# Patient Record
Sex: MALE | Race: WHITE | URBAN - METROPOLITAN AREA
[De-identification: names, ages, dates, MRNs, and addresses within clinical notes are randomized per-mention and may not be internally consistent; named-entity substitution may affect disease eponyms.]

---

## 2017-03-28 ENCOUNTER — HOSPITAL ENCOUNTER (EMERGENCY)
Facility: CLINIC | Age: 77
Discharge: ANOTHER HOSPITAL | End: 2017-03-28
Attending: EMERGENCY MEDICINE | Admitting: EMERGENCY MEDICINE
Payer: MEDICARE

## 2017-03-28 ENCOUNTER — HOSPITAL ENCOUNTER (INPATIENT)
Dept: CARDIOLOGY | Facility: CLINIC | Age: 77
Discharge: NURSING, REHAB OR LONG-TERM CARE | End: 2017-04-14
Attending: INTERNAL MEDICINE | Admitting: INTERNAL MEDICINE
Payer: COMMERCIAL

## 2017-03-28 ENCOUNTER — SURGERY - HEALTHEAST (OUTPATIENT)
Dept: CARDIOLOGY | Facility: CLINIC | Age: 77
End: 2017-03-28

## 2017-03-28 ENCOUNTER — APPOINTMENT (OUTPATIENT)
Dept: GENERAL RADIOLOGY | Facility: CLINIC | Age: 77
End: 2017-03-28
Attending: EMERGENCY MEDICINE
Payer: MEDICARE

## 2017-03-28 VITALS
TEMPERATURE: 97.7 F | OXYGEN SATURATION: 96 % | RESPIRATION RATE: 15 BRPM | BODY MASS INDEX: 34.86 KG/M2 | SYSTOLIC BLOOD PRESSURE: 129 MMHG | DIASTOLIC BLOOD PRESSURE: 72 MMHG | WEIGHT: 230 LBS | HEIGHT: 68 IN

## 2017-03-28 DIAGNOSIS — E78.5 DYSLIPIDEMIA: ICD-10-CM

## 2017-03-28 DIAGNOSIS — H40.9 GLAUCOMA: ICD-10-CM

## 2017-03-28 DIAGNOSIS — M10.9 GOUT: ICD-10-CM

## 2017-03-28 DIAGNOSIS — I25.83 CORONARY ARTERY DISEASE DUE TO LIPID RICH PLAQUE: ICD-10-CM

## 2017-03-28 DIAGNOSIS — I25.10 CORONARY ARTERY DISEASE DUE TO LIPID RICH PLAQUE: ICD-10-CM

## 2017-03-28 DIAGNOSIS — D69.6 THROMBOCYTOPENIA (H): ICD-10-CM

## 2017-03-28 DIAGNOSIS — G47.00 INSOMNIA: ICD-10-CM

## 2017-03-28 DIAGNOSIS — I48.0 PAROXYSMAL ATRIAL FIBRILLATION (H): ICD-10-CM

## 2017-03-28 DIAGNOSIS — N40.0 BPH (BENIGN PROSTATIC HYPERPLASIA): ICD-10-CM

## 2017-03-28 DIAGNOSIS — R33.9 URINARY RETENTION: ICD-10-CM

## 2017-03-28 DIAGNOSIS — R52 PAIN: ICD-10-CM

## 2017-03-28 DIAGNOSIS — N28.9 RENAL INSUFFICIENCY: ICD-10-CM

## 2017-03-28 DIAGNOSIS — Z13.9 SCREENING: ICD-10-CM

## 2017-03-28 DIAGNOSIS — I21.4 NSTEMI (NON-ST ELEVATED MYOCARDIAL INFARCTION) (H): ICD-10-CM

## 2017-03-28 DIAGNOSIS — Z95.1 S/P CABG X 3: ICD-10-CM

## 2017-03-28 DIAGNOSIS — K21.9 GERD (GASTROESOPHAGEAL REFLUX DISEASE): ICD-10-CM

## 2017-03-28 DIAGNOSIS — D64.9 ANEMIA: ICD-10-CM

## 2017-03-28 DIAGNOSIS — E11.9 TYPE 2 DIABETES MELLITUS WITHOUT COMPLICATION, WITHOUT LONG-TERM CURRENT USE OF INSULIN (H): ICD-10-CM

## 2017-03-28 DIAGNOSIS — E87.70 VOLUME OVERLOAD: ICD-10-CM

## 2017-03-28 DIAGNOSIS — D64.9 ANEMIA, UNSPECIFIED TYPE: ICD-10-CM

## 2017-03-28 LAB
ALBUMIN SERPL-MCNC: 3.1 G/DL (ref 3.4–5)
ALP SERPL-CCNC: 82 U/L (ref 40–150)
ALT SERPL W P-5'-P-CCNC: 22 U/L (ref 0–70)
ANION GAP SERPL CALCULATED.3IONS-SCNC: 14 MMOL/L (ref 3–14)
AORTIC ROOT: 3.7 CM
AORTIC VALVE MEAN VELOCITY: 78.5 CM/S
ASCENDING AORTA: 2.8 CM
AST SERPL W P-5'-P-CCNC: 15 U/L (ref 0–45)
AV DIMENSIONLESS INDEX VTI: 0.8
AV MEAN GRADIENT: 3 MMHG
AV PEAK GRADIENT: 6.6 MMHG
AV VALVE AREA: 2.1 CM2
AV VELOCITY RATIO: 0.8
BASOPHILS # BLD AUTO: 0 10E9/L (ref 0–0.2)
BASOPHILS NFR BLD AUTO: 0.5 %
BILIRUB SERPL-MCNC: 0.2 MG/DL (ref 0.2–1.3)
BSA FOR ECHO PROCEDURE: 2.3 M2
BUN SERPL-MCNC: 27 MG/DL (ref 7–30)
CALCIUM SERPL-MCNC: 8.3 MG/DL (ref 8.5–10.1)
CHLORIDE SERPL-SCNC: 108 MMOL/L (ref 94–109)
CHOLEST SERPL-MCNC: 160 MG/DL
CO2 SERPL-SCNC: 20 MMOL/L (ref 20–32)
CREAT SERPL-MCNC: 1.43 MG/DL (ref 0.66–1.25)
CV BLOOD PRESSURE: NORMAL MMHG
CV ECHO HEIGHT: 68 IN
CV ECHO WEIGHT: 244 LBS
DIFFERENTIAL METHOD BLD: ABNORMAL
DOP CALC AO PEAK VEL: 128 CM/S
DOP CALC AO VTI: 26.7 CM
DOP CALC LVOT AREA: 2.54 CM2
DOP CALC LVOT DIAMETER: 1.8 CM
DOP CALC LVOT PEAK VEL: 99.6 CM/S
DOP CALC LVOT STROKE VOLUME: 57 CM3
DOP CALCLVOT PEAK VEL VTI: 22.4 CM
EJECTION FRACTION: 56 % (ref 55–75)
EOSINOPHIL # BLD AUTO: 0.4 10E9/L (ref 0–0.7)
EOSINOPHIL NFR BLD AUTO: 7.2 %
ERYTHROCYTE [DISTWIDTH] IN BLOOD BY AUTOMATED COUNT: 13.8 % (ref 10–15)
FRACTIONAL SHORTENING: 26.2 % (ref 28–44)
GFR SERPL CREATININE-BSD FRML MDRD: 48 ML/MIN/1.7M2
GLUCOSE SERPL-MCNC: 162 MG/DL (ref 70–99)
HBA1C MFR BLD: 6 % (ref 4.2–6.1)
HCT VFR BLD AUTO: 40.6 % (ref 40–53)
HDLC SERPL-MCNC: 41 MG/DL
HGB BLD-MCNC: 12.7 G/DL (ref 13.3–17.7)
IMM GRANULOCYTES # BLD: 0 10E9/L (ref 0–0.4)
IMM GRANULOCYTES NFR BLD: 0.7 %
INTERVENTRICULAR SEPTUM IN END DIASTOLE: 1.6 CM (ref 0.6–1)
IVS/PW RATIO: 0.9
LA AREA 1: 20.4 CM2
LA AREA 2: 20.1 CM2
LDLC SERPL CALC-MCNC: ABNORMAL MG/DL
LEFT ATRIUM LENGTH: 5.04 CM
LEFT ATRIUM SIZE: 4.8 CM
LEFT ATRIUM VOLUME INDEX: 30.1 ML/M2
LEFT ATRIUM VOLUME: 69.2 CM3
LEFT VENTRICLE CARDIAC INDEX: 1.6 L/MIN/M2
LEFT VENTRICLE CARDIAC OUTPUT: 3.7 L/MIN
LEFT VENTRICLE DIASTOLIC VOLUME INDEX: 42.2 CM3/M2 (ref 34–74)
LEFT VENTRICLE DIASTOLIC VOLUME: 97 CM3 (ref 62–150)
LEFT VENTRICLE HEART RATE: 65 BPM
LEFT VENTRICLE MASS INDEX: 251.9 G/M2
LEFT VENTRICLE SYSTOLIC VOLUME INDEX: 18.7 CM3/M2 (ref 11–31)
LEFT VENTRICLE SYSTOLIC VOLUME: 43 CM3 (ref 21–61)
LEFT VENTRICULAR INTERNAL DIMENSION IN DIASTOLE: 6.5 CM (ref 4.2–5.8)
LEFT VENTRICULAR INTERNAL DIMENSION IN SYSTOLE: 4.8 CM (ref 2.5–4)
LEFT VENTRICULAR MASS: 579.4 G
LEFT VENTRICULAR OUTFLOW TRACT MEAN GRADIENT: 2 MMHG
LEFT VENTRICULAR OUTFLOW TRACT MEAN VELOCITY: 62.6 CM/S
LEFT VENTRICULAR OUTFLOW TRACT PEAK GRADIENT: 4 MMHG
LEFT VENTRICULAR POSTERIOR WALL IN END DIASTOLE: 1.8 CM (ref 0.6–1)
LV STROKE VOLUME INDEX: 24.8 ML/M2
LYMPHOCYTES # BLD AUTO: 1.6 10E9/L (ref 0.8–5.3)
LYMPHOCYTES NFR BLD AUTO: 26.9 %
MCH RBC QN AUTO: 28.7 PG (ref 26.5–33)
MCHC RBC AUTO-ENTMCNC: 31.3 G/DL (ref 31.5–36.5)
MCV RBC AUTO: 92 FL (ref 78–100)
MITRAL VALVE E/A RATIO: 0.8
MONOCYTES # BLD AUTO: 0.6 10E9/L (ref 0–1.3)
MONOCYTES NFR BLD AUTO: 10.4 %
MV AVERAGE E/E' RATIO: 18.8 CM/S
MV DECELERATION TIME: 264 MS
MV E'TISSUE VEL-LAT: 6.04 CM/S
MV E'TISSUE VEL-MED: 5.56 CM/S
MV LATERAL E/E' RATIO: 18
MV MEDIAL E/E' RATIO: 19.6
MV PEAK A VELOCITY: 135 CM/S
MV PEAK E VELOCITY: 109 CM/S
NEUTROPHILS # BLD AUTO: 3.3 10E9/L (ref 1.6–8.3)
NEUTROPHILS NFR BLD AUTO: 54.3 %
NT-PROBNP SERPL-MCNC: 151 PG/ML (ref 0–1800)
NUC REST DIASTOLIC VOLUME INDEX: 3911 LBS
NUC REST SYSTOLIC VOLUME INDEX: 68 IN
PLATELET # BLD AUTO: 72 10E9/L (ref 150–450)
POTASSIUM SERPL-SCNC: 4.3 MMOL/L (ref 3.4–5.3)
PROT SERPL-MCNC: 7.1 G/DL (ref 6.8–8.8)
RBC # BLD AUTO: 4.42 10E12/L (ref 4.4–5.9)
SODIUM SERPL-SCNC: 142 MMOL/L (ref 133–144)
TRIGL SERPL-MCNC: 419 MG/DL
TROPONIN I SERPL-MCNC: 0.04 UG/L (ref 0–0.04)
TROPONIN I SERPL-MCNC: 0.39 UG/L (ref 0–0.04)
WBC # BLD AUTO: 6 10E9/L (ref 4–11)

## 2017-03-28 PROCEDURE — 96361 HYDRATE IV INFUSION ADD-ON: CPT

## 2017-03-28 PROCEDURE — 71020 XR CHEST 2 VW: CPT

## 2017-03-28 PROCEDURE — 83880 ASSAY OF NATRIURETIC PEPTIDE: CPT | Performed by: EMERGENCY MEDICINE

## 2017-03-28 PROCEDURE — 85025 COMPLETE CBC W/AUTO DIFF WBC: CPT | Performed by: EMERGENCY MEDICINE

## 2017-03-28 PROCEDURE — 84484 ASSAY OF TROPONIN QUANT: CPT | Performed by: EMERGENCY MEDICINE

## 2017-03-28 PROCEDURE — 99285 EMERGENCY DEPT VISIT HI MDM: CPT | Mod: 25

## 2017-03-28 PROCEDURE — 93005 ELECTROCARDIOGRAM TRACING: CPT

## 2017-03-28 PROCEDURE — 93010 ELECTROCARDIOGRAM REPORT: CPT | Performed by: EMERGENCY MEDICINE

## 2017-03-28 PROCEDURE — 99285 EMERGENCY DEPT VISIT HI MDM: CPT | Mod: 25 | Performed by: EMERGENCY MEDICINE

## 2017-03-28 PROCEDURE — 25000128 H RX IP 250 OP 636: Performed by: EMERGENCY MEDICINE

## 2017-03-28 PROCEDURE — 80053 COMPREHEN METABOLIC PANEL: CPT | Performed by: EMERGENCY MEDICINE

## 2017-03-28 PROCEDURE — 96374 THER/PROPH/DIAG INJ IV PUSH: CPT

## 2017-03-28 PROCEDURE — 25000132 ZZH RX MED GY IP 250 OP 250 PS 637: Performed by: EMERGENCY MEDICINE

## 2017-03-28 RX ORDER — BRIMONIDINE TARTRATE AND TIMOLOL MALEATE 2; 5 MG/ML; MG/ML
1 SOLUTION OPHTHALMIC 2 TIMES DAILY
COMMUNITY
End: 2017-04-16

## 2017-03-28 RX ORDER — NITROGLYCERIN 0.4 MG/1
0.4 TABLET SUBLINGUAL EVERY 5 MIN PRN
Status: DISCONTINUED | OUTPATIENT
Start: 2017-03-28 | End: 2017-03-28 | Stop reason: HOSPADM

## 2017-03-28 RX ADMIN — HEPARIN SODIUM 12 UNITS/KG/HR: 10000 INJECTION, SOLUTION INTRAVENOUS at 06:02

## 2017-03-28 RX ADMIN — Medication 5000 UNITS: at 06:08

## 2017-03-28 RX ADMIN — NITROGLYCERIN 0.4 MG: 0.4 TABLET SUBLINGUAL at 03:25

## 2017-03-28 ASSESSMENT — ENCOUNTER SYMPTOMS
APPETITE CHANGE: 0
BACK PAIN: 0
SHORTNESS OF BREATH: 1
CHEST TIGHTNESS: 1
FATIGUE: 0
ABDOMINAL PAIN: 0
NAUSEA: 0
FEVER: 0
NUMBNESS: 0
NECK PAIN: 0
VOMITING: 0
CHILLS: 0
LIGHT-HEADEDNESS: 0
WHEEZING: 0
HEADACHES: 0
WEAKNESS: 0
DIARRHEA: 0

## 2017-03-28 ASSESSMENT — MIFFLIN-ST. JEOR
SCORE: 1793.26

## 2017-03-28 NOTE — ED NOTES
Visiting the area staying with family   Awoke with substernal chest pain that radiates down L arm no nausea no dizziness took asa history of MI with stent placement  states this feels similar to MI   911 called was given ntg  1 with no changes hairoute  Has some chronic SOB former smoker

## 2017-03-28 NOTE — ED PROVIDER NOTES
"  History     Chief Complaint   Patient presents with     Chest Pain     HPI  Almas Gaona is a 76 year old male with history of hypertension, hyperlipidemia, diabetes, and previous coronary artery disease with previous MI and stent in his circumflex roughly 20 years ago presents for evaluation of acute onset of central chest pain radiating to his left arm which woke him from sleep about 1 hour prior to ED arrival.  Patient reports associated difficulty breathing without nausea or diaphoresis.  Patient reports symptoms are similar to the previous symptoms he had when he had his last heart attack.  Patient reports he is on aspirin 325 mg daily and also received an additional 324 mg by EMS.  Patient was given sublingual nitro by EMS with no significant change in his chest pain but did have improvement in his breathing.  Overall, patient reports symptoms have improved since EMS contact.  Patient does report lower extremity edema but not significantly changed from baseline.  Denies recent cough or infectious symptoms.  Denies frequent paroxysmal nocturnal dyspnea.    I have reviewed the Medications, Allergies, Past Medical and Surgical History, and Social History in the Epic system.    Review of Systems   Constitutional: Negative for appetite change, chills, fatigue and fever.   HENT: Negative for congestion.    Respiratory: Positive for chest tightness and shortness of breath. Negative for wheezing.    Cardiovascular: Positive for chest pain and leg swelling (chronic, not significantly changed tonight).   Gastrointestinal: Negative for abdominal pain, diarrhea, nausea and vomiting.   Musculoskeletal: Negative for back pain and neck pain.   Skin: Negative for rash.   Neurological: Negative for weakness, light-headedness, numbness and headaches.   All other systems reviewed and are negative.      Physical Exam   BP: 140/82  Heart Rate: 96  Temp: 97.7  F (36.5  C)  Resp: 15  Height: 172.7 cm (5' 8\")  Weight: 104.3 kg " (230 lb)  SpO2: 97 %  Physical Exam   Constitutional: He is oriented to person, place, and time. He appears well-developed and well-nourished. No distress.   HENT:   Head: Normocephalic and atraumatic.   Mouth/Throat: Oropharynx is clear and moist.   Cardiovascular: Normal rate.  An irregular rhythm present.   No murmur heard.  Pulmonary/Chest: Effort normal. He has decreased breath sounds in the right lower field and the left lower field.   Abdominal: Soft. Bowel sounds are normal. There is no tenderness.   Morbidly obese   Genitourinary: Rectal exam shows guaiac negative stool (Hemoccult-negative:  passed).   Musculoskeletal: Normal range of motion. He exhibits edema (1-2+ pitting edema bilaterally).   Neurological: He is alert and oriented to person, place, and time.   Skin: Skin is warm and dry. He is not diaphoretic.   Psychiatric: He has a normal mood and affect.   Nursing note and vitals reviewed.      ED Course     ED Course     Procedures             EKG Interpretation:      Interpreted by Ta Johnson  Time reviewed: 0310  Symptoms at time of EKG: Chest pain   Rhythm: Sinus rhythm with frequent PVCs and a wandering baseline secondary to movement  Rate: Normal  Axis: Normal  Ectopy: premature ventricular contractions (frequent)  Conduction: normal  ST Segments/ T Waves: Probable anterior and inferior lateral ST depressions although motion artifact limits interpretation  Q Waves: II  Comparison to prior: No old EKG available    Clinical Impression: Sinus rhythm with frequent PVCs and old anterior lateral ST depressions         EKG 2 Interpretation:      Interpreted by Ta Johnson  Time reviewed:0358   Symptoms at time of EKG: dyspnea   Rhythm: normal sinus   Rate: normal  Axis: NORMAL  Ectopy: none  Conduction: normal  ST Segments/ T Waves: ST depressions present in leads 1, 2, aVL as well as leads V4 V5 and V6  Q Waves: Lead II  Comparison to prior: Resolution of previous  PVCs.  Anterior depressions resolved but inferior lateral depressions still subtly present     Clinical Impression: sinus rhythm with inferior lateral ST depression      Results for orders placed or performed during the hospital encounter of 03/28/17   XR Chest 2 Views    Narrative    CHEST 2 VIEWS  3/28/2017 3:53 AM     HISTORY: Dyspnea.    COMPARISON: None.    FINDINGS: Hyperinflated lungs. Minimal opacities in the left lung  base. The lungs are otherwise clear. Borderline cardiomegaly.      Impression    IMPRESSION:   1. Minimal opacities in the left lung base could represent atelectasis  or pneumonia.  2. No other findings suspicious for active cardiopulmonary disease.  3. Hyperinflated lungs, suggestive of chronic obstructive pulmonary  disease.    SATISH WYNN MD   CBC with platelets differential   Result Value Ref Range    WBC 6.0 4.0 - 11.0 10e9/L    RBC Count 4.42 4.4 - 5.9 10e12/L    Hemoglobin 12.7 (L) 13.3 - 17.7 g/dL    Hematocrit 40.6 40.0 - 53.0 %    MCV 92 78 - 100 fl    MCH 28.7 26.5 - 33.0 pg    MCHC 31.3 (L) 31.5 - 36.5 g/dL    RDW 13.8 10.0 - 15.0 %    Platelet Count 72 (L) 150 - 450 10e9/L    Diff Method Automated Method     % Neutrophils 54.3 %    % Lymphocytes 26.9 %    % Monocytes 10.4 %    % Eosinophils 7.2 %    % Basophils 0.5 %    % Immature Granulocytes 0.7 %    Absolute Neutrophil 3.3 1.6 - 8.3 10e9/L    Absolute Lymphocytes 1.6 0.8 - 5.3 10e9/L    Absolute Monocytes 0.6 0.0 - 1.3 10e9/L    Absolute Eosinophils 0.4 0.0 - 0.7 10e9/L    Absolute Basophils 0.0 0.0 - 0.2 10e9/L    Abs Immature Granulocytes 0.0 0 - 0.4 10e9/L   Comprehensive metabolic panel   Result Value Ref Range    Sodium 142 133 - 144 mmol/L    Potassium 4.3 3.4 - 5.3 mmol/L    Chloride 108 94 - 109 mmol/L    Carbon Dioxide 20 20 - 32 mmol/L    Anion Gap 14 3 - 14 mmol/L    Glucose 162 (H) 70 - 99 mg/dL    Urea Nitrogen 27 7 - 30 mg/dL    Creatinine 1.43 (H) 0.66 - 1.25 mg/dL    GFR Estimate 48 (L) >60 mL/min/1.7m2     GFR Estimate If Black 58 (L) >60 mL/min/1.7m2    Calcium 8.3 (L) 8.5 - 10.1 mg/dL    Bilirubin Total 0.2 0.2 - 1.3 mg/dL    Albumin 3.1 (L) 3.4 - 5.0 g/dL    Protein Total 7.1 6.8 - 8.8 g/dL    Alkaline Phosphatase 82 40 - 150 U/L    ALT 22 0 - 70 U/L    AST 15 0 - 45 U/L   Troponin I   Result Value Ref Range    Troponin I ES 0.040 0.000 - 0.045 ug/L   Nt probnp inpatient (BNP)   Result Value Ref Range    N-Terminal Pro BNP Inpatient 151 0 - 1800 pg/mL   Troponin I (second draw)   Result Value Ref Range    Troponin I ES 0.389 (HH) 0.000 - 0.045 ug/L         4:05 AM: Called lab due to trop and BNP not running. Will add on now.    4:32 AM: Trop measurable but not positive. Will repeat 1hr level. BNP normal.  Creatinine elevated at 1.43. Pt re-assessed. Pain nearly gone. 1/10 now. Advised of plan.     5:47 AM: Advised pt of climbing trop. Heparin ordered. Pt previously had a stent at [facility] and patient would like to return there.  Paged cardiologist at [facility] for transfer    6:10 AM: Repaged    6:30 AM: Repaged    6:48 AM: Discussed with Hospitalist Dr Rodriguez. Accepts for transfer.    Assessments & Plan (with Medical Decision Making)  76-year-old male with known history of coronary artery disease status post stent past as well as a history of chronic kidney disease presents for evaluation of acute onset of chest pain with difficulty breathing tonight.  Patient reports chest pain symptoms are similar to previous cardiac events.  Chest pain and breathing difficulty improved with nitroglycerin prehospital and in the emergency department.  Initial EKG showed some ST depression suggestive of possible ischemia however, no previous EKG was available for comparison.  Initial troponin measurable blood below the threshold to be a positive test at 0.04.  BNP normal at 151.  Hemoglobin slightly low, 0.7.  Creatinine moderately elevated at 1.43.  Patient is uncertain what his baseline creatinine is.  Troponins were repeated  one hour after arrival to evaluate for trending increase.  One hour level showed nearly tenfold increase in troponin consistent with ACS.  Platelets mildly low at 72 but patient has no symptoms or history of bleeding.  Given the rapidly climbing troponin, risk of bleeding considered lower than the risk for ACS.  Heparin started and patient transferred to [facility].      I have reviewed the nursing notes.    I have reviewed the findings, diagnosis, plan and need for follow up with the patient.    New Prescriptions    No medications on file       Final diagnoses:   NSTEMI (non-ST elevated myocardial infarction) (H)   Thrombocytopenia (H)   Renal insufficiency   Anemia, unspecified type       3/28/2017   [facility] EMERGENCY DEPARTMENT     Johnson, Ta Coburn MD  03/28/17 0641

## 2017-03-28 NOTE — ED NOTES
Bed: ED03  Expected date: 3/28/17  Expected time: 3:05 AM  Means of arrival:   Comments:  chest pain

## 2017-03-28 NOTE — PHARMACY-ANTICOAGULATION SERVICE
Patient to start the heparin C-V/Vascular protocol with a goal anti 10a level of 0.15-0.35. Using a HT of 68 inches and a WT of 104.3 kg, a dosing WT of 82.8 kg was calculated. Based on this dosing WT, give patient a heparin bolus of 5000 units from the bag and then start a drip at 1000 units/hr. Check the patient's anti 10a level 6 hours after starting the drip at ~0600.   Per C-V/Vascular protocol.    ZENY Fink.Ph.

## 2017-03-30 ENCOUNTER — ANESTHESIA - HEALTHEAST (OUTPATIENT)
Dept: SURGERY | Facility: CLINIC | Age: 77
End: 2017-03-30

## 2017-03-30 LAB
ATRIAL RATE - MUSE: 61 BPM
DIASTOLIC BLOOD PRESSURE - MUSE: NORMAL MMHG
INTERPRETATION ECG - MUSE: NORMAL
P AXIS - MUSE: 44 DEGREES
PR INTERVAL - MUSE: 196 MS
QRS DURATION - MUSE: 100 MS
QT - MUSE: 410 MS
QTC - MUSE: 412 MS
R AXIS - MUSE: -20 DEGREES
SYSTOLIC BLOOD PRESSURE - MUSE: NORMAL MMHG
T AXIS - MUSE: 7 DEGREES
VENTRICULAR RATE- MUSE: 61 BPM

## 2017-03-30 ASSESSMENT — MIFFLIN-ST. JEOR
SCORE: 1777.67

## 2017-03-31 LAB
HEPARIN INDUCED PLATELET ANTIBODY - HISTORICAL: 0.17 OD (ref 0–0.4)
HIV 1+2 AB+HIV1 P24 AG SERPL QL IA: NEGATIVE
INHIBITION %: NORMAL %

## 2017-03-31 ASSESSMENT — MIFFLIN-ST. JEOR
SCORE: 1738.21

## 2017-04-01 LAB
ERYTHROCYTE [DISTWIDTH] IN BLOOD BY AUTOMATED COUNT: 13.9 % (ref 11–14.5)
HCT VFR BLD AUTO: 39.3 % (ref 40–54)
HGB BLD-MCNC: 12.9 G/DL (ref 14–18)
MCH RBC QN AUTO: 29.7 PG (ref 27–34)
MCHC RBC AUTO-ENTMCNC: 32.8 G/DL (ref 32–36)
MCV RBC AUTO: 91 FL (ref 80–100)
PATH REPORT.MICROSCOPIC SPEC OTHER STN: ABNORMAL
PLATELET # BLD AUTO: 103 THOU/UL (ref 140–440)
PMV BLD AUTO: 14.7 FL (ref 8.5–12.5)
RBC # BLD AUTO: 4.34 MILL/UL (ref 4.4–6.2)
WBC: 7 THOU/UL (ref 4–11)

## 2017-04-01 ASSESSMENT — MIFFLIN-ST. JEOR
SCORE: 1740.93

## 2017-04-02 ASSESSMENT — MIFFLIN-ST. JEOR
SCORE: 1738.66

## 2017-04-03 LAB
BLD PROD TYP BPU: NORMAL
BLD PROD TYP BPU: NORMAL
BLOOD EXPIRATION DATE: NORMAL
BLOOD EXPIRATION DATE: NORMAL
BLOOD TYPE: 8400
BLOOD TYPE: 8400
CODING SYSTEM: NORMAL
CODING SYSTEM: NORMAL
COMPONENT (HISTORICAL CONVERSION): NORMAL
COMPONENT (HISTORICAL CONVERSION): NORMAL
CROSSMATCH: NORMAL
CROSSMATCH: NORMAL
HCV AB SERPL QL IA: NEGATIVE
LAB AP CHARGES (HE HISTORICAL CONVERSION): NORMAL
PATH REPORT.COMMENTS IMP SPEC: NORMAL
PATH REPORT.COMMENTS IMP SPEC: NORMAL
PATH REPORT.FINAL DX SPEC: NORMAL
PATH REPORT.MICROSCOPIC SPEC OTHER STN: NORMAL
PATH REPORT.RELEVANT HX SPEC: NORMAL
STATUS (HISTORICAL CONVERSION): NORMAL
STATUS (HISTORICAL CONVERSION): NORMAL
UNIT ABO/RH (HISTORICAL CONVERSION): NORMAL
UNIT ABO/RH (HISTORICAL CONVERSION): NORMAL
UNIT NUMBER: NORMAL
UNIT NUMBER: NORMAL

## 2017-04-03 ASSESSMENT — MIFFLIN-ST. JEOR
SCORE: 1736.39

## 2017-04-04 ASSESSMENT — MIFFLIN-ST. JEOR
SCORE: 1729.13

## 2017-04-05 ENCOUNTER — SURGERY - HEALTHEAST (OUTPATIENT)
Dept: SURGERY | Facility: CLINIC | Age: 77
End: 2017-04-05

## 2017-04-05 LAB
ATRIAL RATE - MUSE: 67 BPM
DIASTOLIC BLOOD PRESSURE - MUSE: NORMAL MMHG
INTERPRETATION ECG - MUSE: NORMAL
P AXIS - MUSE: NORMAL DEGREES
PR INTERVAL - MUSE: 224 MS
QRS DURATION - MUSE: 130 MS
QT - MUSE: 462 MS
QTC - MUSE: 488 MS
R AXIS - MUSE: -32 DEGREES
SYSTOLIC BLOOD PRESSURE - MUSE: NORMAL MMHG
T AXIS - MUSE: -17 DEGREES
VENTRICULAR RATE- MUSE: 67 BPM

## 2017-04-05 ASSESSMENT — MIFFLIN-ST. JEOR
SCORE: 1731.86

## 2017-04-06 LAB
ATRIAL RATE - MUSE: 75 BPM
BLD PROD TYP BPU: NORMAL
BLOOD EXPIRATION DATE: NORMAL
BLOOD TYPE: 2800
CODING SYSTEM: NORMAL
COMPONENT (HISTORICAL CONVERSION): NORMAL
CROSSMATCH: NORMAL
DIASTOLIC BLOOD PRESSURE - MUSE: NORMAL MMHG
INTERPRETATION ECG - MUSE: NORMAL
P AXIS - MUSE: 28 DEGREES
PR INTERVAL - MUSE: 162 MS
QRS DURATION - MUSE: 120 MS
QT - MUSE: 418 MS
QTC - MUSE: 466 MS
R AXIS - MUSE: -18 DEGREES
STATUS (HISTORICAL CONVERSION): NORMAL
SYSTOLIC BLOOD PRESSURE - MUSE: NORMAL MMHG
T AXIS - MUSE: 22 DEGREES
UNIT ABO/RH (HISTORICAL CONVERSION): NORMAL
UNIT NUMBER: NORMAL
VENTRICULAR RATE- MUSE: 75 BPM

## 2017-04-06 ASSESSMENT — MIFFLIN-ST. JEOR
SCORE: 1676.06

## 2017-04-07 LAB
BLD PROD TYP BPU: NORMAL
BLOOD EXPIRATION DATE: NORMAL
BLOOD TYPE: 2800
CODING SYSTEM: NORMAL
COMPONENT (HISTORICAL CONVERSION): NORMAL
CROSSMATCH: NORMAL
STATUS (HISTORICAL CONVERSION): NORMAL
UNIT ABO/RH (HISTORICAL CONVERSION): NORMAL
UNIT NUMBER: NORMAL

## 2017-04-08 LAB
BLD PROD TYP BPU: NORMAL
BLD PROD TYP BPU: NORMAL
BLOOD EXPIRATION DATE: NORMAL
BLOOD EXPIRATION DATE: NORMAL
BLOOD TYPE: 8400
BLOOD TYPE: 8400
CODING SYSTEM: NORMAL
CODING SYSTEM: NORMAL
COMPONENT (HISTORICAL CONVERSION): NORMAL
COMPONENT (HISTORICAL CONVERSION): NORMAL
CROSSMATCH: NORMAL
CROSSMATCH: NORMAL
STATUS (HISTORICAL CONVERSION): NORMAL
STATUS (HISTORICAL CONVERSION): NORMAL
UNIT ABO/RH (HISTORICAL CONVERSION): NORMAL
UNIT ABO/RH (HISTORICAL CONVERSION): NORMAL
UNIT NUMBER: NORMAL
UNIT NUMBER: NORMAL

## 2017-04-08 ASSESSMENT — MIFFLIN-ST. JEOR
SCORE: 1782.49

## 2017-04-09 ASSESSMENT — MIFFLIN-ST. JEOR
SCORE: 1792.69

## 2017-04-10 LAB
LAB AP CHARGES (HE HISTORICAL CONVERSION): NORMAL
PATH REPORT.COMMENTS IMP SPEC: NORMAL
PATH REPORT.FINAL DX SPEC: NORMAL
PATH REPORT.GROSS SPEC: NORMAL
PATH REPORT.MICROSCOPIC SPEC OTHER STN: NORMAL
PATH REPORT.RELEVANT HX SPEC: NORMAL
RESULT FLAG (HE HISTORICAL CONVERSION): NORMAL

## 2017-04-10 ASSESSMENT — MIFFLIN-ST. JEOR
SCORE: 1781.3

## 2017-04-11 ASSESSMENT — MIFFLIN-ST. JEOR
SCORE: 1766.33

## 2017-04-12 LAB
CREAT SERPL-MCNC: 1.48 MG/DL (ref 0.7–1.3)
GFR SERPL CREATININE-BSD FRML MDRD: 46 ML/MIN/1.73M2

## 2017-04-12 ASSESSMENT — MIFFLIN-ST. JEOR
SCORE: 1758.62

## 2017-04-14 ENCOUNTER — COMMUNICATION - HEALTHEAST (OUTPATIENT)
Dept: NEUROSURGERY | Facility: CLINIC | Age: 77
End: 2017-04-14

## 2017-04-14 ASSESSMENT — MIFFLIN-ST. JEOR
SCORE: 1769.96

## 2017-04-15 ENCOUNTER — TELEPHONE (OUTPATIENT)
Dept: GERIATRICS | Facility: CLINIC | Age: 77
End: 2017-04-15

## 2017-04-15 NOTE — TELEPHONE ENCOUNTER
Update on labs hgb 8.4  Had to have nurse call back with more information, she did not have any information on last hospitalization  Nursing returned call back with updated information  Patient just admitted to facility yesterday from hospital nurse is unsure  Last Hgb 8.0 4/11/17 after CaBG surgery  Patient vitals are stable, patient is asymptomatic, no SOB, CP, palpitations  Order:   Recheck Hgb on Monday

## 2017-04-16 VITALS
WEIGHT: 237 LBS | TEMPERATURE: 98 F | DIASTOLIC BLOOD PRESSURE: 63 MMHG | RESPIRATION RATE: 19 BRPM | SYSTOLIC BLOOD PRESSURE: 117 MMHG | BODY MASS INDEX: 36.04 KG/M2 | OXYGEN SATURATION: 93 % | HEART RATE: 65 BPM

## 2017-04-16 RX ORDER — LATANOPROST 50 UG/ML
1 SOLUTION/ DROPS OPHTHALMIC AT BEDTIME
COMMUNITY

## 2017-04-16 RX ORDER — FERROUS SULFATE 325(65) MG
325 TABLET ORAL 2 TIMES DAILY
COMMUNITY

## 2017-04-17 ENCOUNTER — NURSING HOME VISIT (OUTPATIENT)
Dept: GERIATRICS | Facility: CLINIC | Age: 77
End: 2017-04-17
Payer: COMMERCIAL

## 2017-04-17 ENCOUNTER — TRANSFERRED RECORDS (OUTPATIENT)
Dept: HEALTH INFORMATION MANAGEMENT | Facility: CLINIC | Age: 77
End: 2017-04-17

## 2017-04-17 DIAGNOSIS — I25.10 ATHEROSCLEROSIS OF NATIVE CORONARY ARTERY OF NATIVE HEART WITHOUT ANGINA PECTORIS: ICD-10-CM

## 2017-04-17 DIAGNOSIS — G47.33 OSA (OBSTRUCTIVE SLEEP APNEA): ICD-10-CM

## 2017-04-17 DIAGNOSIS — Z95.1 POSTSURGICAL AORTOCORONARY BYPASS STATUS: ICD-10-CM

## 2017-04-17 DIAGNOSIS — I10 ESSENTIAL HYPERTENSION: ICD-10-CM

## 2017-04-17 DIAGNOSIS — I21.4 NON-ST ELEVATION MYOCARDIAL INFARCTION (NSTEMI) (H): Primary | ICD-10-CM

## 2017-04-17 DIAGNOSIS — E78.2 MIXED HYPERLIPIDEMIA: ICD-10-CM

## 2017-04-17 DIAGNOSIS — E11.59 TYPE 2 DIABETES MELLITUS WITH OTHER CIRCULATORY COMPLICATION: ICD-10-CM

## 2017-04-17 LAB
CREAT SERPL-MCNC: 1.19 MG/DL (ref 0.7–1.3)
GFR SERPL CREATININE-BSD FRML MDRD: 59 ML/MIN/1.73M2
GLUCOSE SERPL-MCNC: 103 MG/DL (ref 70–125)
POTASSIUM SERPL-SCNC: 3.8 MMOL/L (ref 3.5–5)

## 2017-04-17 PROCEDURE — 99306 1ST NF CARE HIGH MDM 50: CPT | Performed by: FAMILY MEDICINE

## 2017-04-17 PROCEDURE — 99207 ZZC CDG-CORRECTLY CODED, REVIEWED AND AGREE: CPT | Performed by: FAMILY MEDICINE

## 2017-04-17 RX ORDER — POTASSIUM CHLORIDE 1.5 G/1.58G
20 POWDER, FOR SOLUTION ORAL DAILY
COMMUNITY

## 2017-04-17 NOTE — PATIENT INSTRUCTIONS
Orders:  1.  Fesoy 325 po qd for anemia.  2.  Increase Lasix to 40 mg po bid for edema.  3.  KCL 20 meq po q am for edema.  4.  CBC, CMP, Magnesium on 4/17/17.  5.  Follow up with Cardiac surgeon on 4/25/17 with Angelo CARLSON -Let family know time and location.  6.  Follow up with Cardiology in 6 weeks for CAD. #[phone].

## 2017-04-17 NOTE — PROGRESS NOTES
[facility]  PRIMARY CARE PROVIDER AND CLINIC:  [facility] [address] [facility]   / FERNANDO*  Chief Complaint   Patient presents with     Hospital F/U       HPI:    Almas Gaona is a 76 year old  (1940),admitted to the [facility]   Admitted to this facility for  rehab, medical management and nursing care. Current issues are:       76 year old male with history of hypertension, dyslipidemia, diabetes mellitis, history of CAD.had presented emergency with complaint of chest pain, found to have an NSTEMI with elevated troponin, underwent coronary angiogram which showed multiple vessel disease and is planned for CABG.        Non-ST elevation myocardial infarction (NSTEMI) (H)  Postsurgical aortocoronary bypass status  HTN (hypertension)  Atherosclerotic heart disease of native coronary artery without angina pectoris  DM type 2 (diabetes mellitus, type 2) (H)  SLOAN (obstructive sleep apnea)  Mixed hyperlipidemia     4/5/17  Operation Performed:  1.  Triple vessel coronary artery bypass grafting procedures; left internal mammary artery to left anterior descending coronary artery and separate reversed saphenous vein grafts to the lateral branch of the obtuse marginal and the distal right coronary arteries.  2.  Endoscopic vein procurement from the left lower extremity.  SURGEONS:  Laila Leiva MD    CODE STATUS/ADVANCE DIRECTIVES DISCUSSION:   CPR/Full code   Patient's living condition: lives with spouse    ALLERGIES:Sulfamethoxazole and Trimethoprim  PAST MEDICAL HISTORY:  has no past medical history on file.  PAST SURGICAL HISTORY:  has no past surgical history on file.  FAMILY HISTORY: family history is not on file.  SOCIAL HISTORY:      Post Discharge Medication Reconciliation Status: discharge medications reconciled, continue medications without change.  Current Outpatient Prescriptions   Medication Sig Dispense Refill     Acetaminophen (TYLENOL PO)  Take 650 mg by mouth every 4 hours as needed for mild pain or fever       ascorbic acid (VITAMIN C) 500 MG CPCR CR capsule Take 500 mg by mouth 3 times daily       ASPIRIN EC PO Take 81 mg by mouth daily       DOCUSATE SODIUM PO Take 100 mg by mouth 2 times daily       ferrous sulfate (IRON) 325 (65 FE) MG tablet Take 325 mg by mouth 2 times daily       Furosemide (LASIX PO) Take 20 mg by mouth 2 times daily       latanoprost (XALATAN) 0.005 % ophthalmic solution Place 1 drop into both eyes At Bedtime       NIACIN, ANTIHYPERLIPIDEMIC, PO Take 250 mg by mouth 2 times daily       insulin aspart (NOVOLOG FLEXPEN) 100 UNIT/ML injection Inject Subcutaneous 3 times daily (with meals)       OMEPRAZOLE PO Take 20 mg by mouth every morning       traZODone (DESYREL) 5 mg/ml SUSP Take 50 mg by mouth At Bedtime       ALLOPURINOL PO Take 100 mg by mouth       ATENOLOL PO Take 25 mg by mouth       ATORVASTATIN CALCIUM PO Take 20 mg by mouth       TAMSULOSIN HCL PO Take 0.4 mg by mouth daily       AMLODIPINE BESYLATE PO Take 5 mg by mouth         ROS:  10 point ROS of systems including Constitutional, Eyes, Respiratory, Cardiovascular, Gastroenterology, Genitourinary, Integumentary, Muscularskeletal, Psychiatric were all negative except for pertinent positives noted in my HPI.    Exam:  /63  Pulse 65  Temp 98  F (36.7  C)  Resp 19  Wt 237 lb (107.5 kg)  SpO2 93%  BMI 36.04 kg/m2    Gen: sitting in chair, alert, cooperative and in no acute distress  HEENT: normocephalic; oropharynx clear  Card: RRR, S1, S2, no murmurs; midline incision well healed . 2 plus edema in both legs   Resp: lungs clear to auscultation bilaterally, no wheezes or crackles  GI: abdomen soft, not-tender  MSK: normal muscle tone, no LE edema  Neuro: CX II-XII grossly in tact; ROM in all four extremities grossly in tact  Psych: alert and oriented x3; normal affect  Skin: warm, no suspicious rashes or lesions noted      Lab/Diagnostic data:    4/15/17  Na:  141, K+:  4.4, Chl:  105, CO2:  12, HGB:  8.4, HCT:  27.8, MCV:  93, MCH:  28.2, MCHC:  30.2, PLT:  328.  4/11/17  HGB:  8.0, HCT:  24.9  3/3/17  HGB:  11.5, HCT:  35.7, MCHC:  32.2,     WBC   Date Value Ref Range Status   03/28/2017 6.0 4.0 - 11.0 10e9/L Final   ]  Hemoglobin   Date Value Ref Range Status   03/28/2017 12.7 (L) 13.3 - 17.7 g/dL Final   ]  Last Basic Metabolic Panel:  Lab Results   Component Value Date     03/28/2017      Lab Results   Component Value Date    POTASSIUM 4.3 03/28/2017     Lab Results   Component Value Date    RICHARD 8.3 03/28/2017     Lab Results   Component Value Date    CO2 20 03/28/2017     Lab Results   Component Value Date    BUN 27 03/28/2017     Lab Results   Component Value Date    CR 1.43 03/28/2017     Lab Results   Component Value Date     03/28/2017       ASSESSMENT/PLAN:  1. Non-ST elevation myocardial infarction (NSTEMI) (H)    2. Postsurgical aortocoronary bypass status    3. Essential hypertension    4. Atherosclerosis of native coronary artery of native heart without angina pectoris    5. 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 SLOAN (obstructive sleep apnea)    7. Mixed hyperlipidemia          Orders:  1.  Fesoy 325 po qd for anemia.  2.  Increase Lasix to 40 mg po bid for edema.  3.  KCL 20 meq po q am for edema.  4.  CBC, CMP, Magnesium on 4/17/17.  5.  Follow up with Cardiac surgeon on 4/25/17 with Angelo CARLSON -Let family know time and location.  6.  Follow up with Cardiology in 6 weeks for CAD. #[phone].      Information reviewed:  Medications, vital signs, orders, nursing notes, problem list, hospital information. Total time spent with patient visit was 45 min including patient visit and review of past records. Greater than 50% of total time spent with counseling and coordinating care.    Electronically signed by:  Sherri Coon MD, MD

## 2017-04-21 ENCOUNTER — TRANSFERRED RECORDS (OUTPATIENT)
Dept: HEALTH INFORMATION MANAGEMENT | Facility: CLINIC | Age: 77
End: 2017-04-21

## 2017-04-23 VITALS
TEMPERATURE: 97.7 F | SYSTOLIC BLOOD PRESSURE: 120 MMHG | WEIGHT: 226 LBS | BODY MASS INDEX: 34.25 KG/M2 | HEART RATE: 63 BPM | HEIGHT: 68 IN | DIASTOLIC BLOOD PRESSURE: 65 MMHG

## 2017-04-24 ENCOUNTER — TRANSFERRED RECORDS (OUTPATIENT)
Dept: HEALTH INFORMATION MANAGEMENT | Facility: CLINIC | Age: 77
End: 2017-04-24

## 2017-04-24 ENCOUNTER — NURSING HOME VISIT (OUTPATIENT)
Dept: GERIATRICS | Facility: CLINIC | Age: 77
End: 2017-04-24
Payer: COMMERCIAL

## 2017-04-24 DIAGNOSIS — E78.2 MIXED HYPERLIPIDEMIA: ICD-10-CM

## 2017-04-24 DIAGNOSIS — Z95.1 POSTSURGICAL AORTOCORONARY BYPASS STATUS: ICD-10-CM

## 2017-04-24 DIAGNOSIS — E11.59 TYPE 2 DIABETES MELLITUS WITH OTHER CIRCULATORY COMPLICATION: ICD-10-CM

## 2017-04-24 DIAGNOSIS — I10 ESSENTIAL HYPERTENSION: ICD-10-CM

## 2017-04-24 DIAGNOSIS — I21.4 NON-ST ELEVATION MYOCARDIAL INFARCTION (NSTEMI) (H): Primary | ICD-10-CM

## 2017-04-24 DIAGNOSIS — I25.10 ATHEROSCLEROSIS OF NATIVE CORONARY ARTERY OF NATIVE HEART WITHOUT ANGINA PECTORIS: ICD-10-CM

## 2017-04-24 DIAGNOSIS — G47.33 OSA (OBSTRUCTIVE SLEEP APNEA): ICD-10-CM

## 2017-04-24 LAB
CREAT SERPL-MCNC: 1.44 MG/DL (ref 0.7–1.3)
GFR SERPL CREATININE-BSD FRML MDRD: 48 ML/MIN/1.73M2
GLUCOSE SERPL-MCNC: 99 MG/DL (ref 70–125)
POTASSIUM SERPL-SCNC: 4.6 MMOL/L (ref 3.5–5)

## 2017-04-24 PROCEDURE — 99207 ZZC CDG-CORRECTLY CODED, REVIEWED AND AGREE: CPT | Performed by: FAMILY MEDICINE

## 2017-04-24 PROCEDURE — 99309 SBSQ NF CARE MODERATE MDM 30: CPT | Performed by: FAMILY MEDICINE

## 2017-04-24 NOTE — PROGRESS NOTES
[facility]    Chief Complaint   Patient presents with     RECHECK       HPI:    Almas Gaona is a 76 year old  (1940), who is being seen today for an episodic care visit at [facility]. 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 Lasix then decreased to 60 mg po q am. Kidney function watched galdino.    ALLERGIES: Sulfamethoxazole and Trimethoprim  Past Medical, Surgical, Family and Social History reviewed and updated in [facility].    Current Outpatient Prescriptions   Medication Sig Dispense Refill     potassium chloride (KLOR-CON) 20 MEQ Packet Take 20 mEq by mouth daily       Acetaminophen (TYLENOL PO) Take 650 mg by mouth every 4 hours as needed for mild pain or fever       ascorbic acid (VITAMIN C) 500 MG CPCR CR capsule Take 500 mg by mouth 3 times daily       ASPIRIN EC PO Take 81 mg by mouth daily       DOCUSATE SODIUM PO Take 100 mg by mouth 2 times daily       ferrous sulfate (IRON) 325 (65 FE) MG tablet Take 325 mg by mouth daily (with breakfast)        Furosemide (LASIX PO) Take 40 mg by mouth 2 times daily        latanoprost (XALATAN) 0.005 % ophthalmic solution Place 1 drop into both eyes At Bedtime       NIACIN, ANTIHYPERLIPIDEMIC, PO Take 250 mg by mouth 2 times daily       insulin aspart (NOVOLOG FLEXPEN) 100 UNIT/ML injection Inject Subcutaneous 3 times daily (with meals)       OMEPRAZOLE PO Take 20 mg by mouth every morning       traZODone (DESYREL) 5 mg/ml SUSP Take 50 mg by mouth At Bedtime       ALLOPURINOL PO Take 100 mg by mouth       ATENOLOL PO Take 25 mg by mouth    "    ATORVASTATIN CALCIUM PO Take 20 mg by mouth       TAMSULOSIN HCL PO Take 0.4 mg by mouth daily       AMLODIPINE BESYLATE PO Take 5 mg by mouth       Medications reviewed:  Medications reconciled to facility chart and changes were made to reflect current medications as identified as above med list. Below are the changes that were made:   Medications stopped since last EPIC medication reconciliation:   There are no discontinued medications.    Medications started since last [facility] medication reconciliation:  No orders of the defined types were placed in this encounter.        REVIEW OF SYSTEMS:  10 point ROS of systems including Constitutional, Eyes, Respiratory, Cardiovascular, Gastroenterology, Genitourinary, Integumentary, Muscularskeletal, Psychiatric were all negative except for pertinent positives noted in my HPI.    Physical Exam:  /65  Pulse 63  Temp 97.7  F (36.5  C)  Ht 5' 8\" (1.727 m)  Wt 226 lb (102.5 kg)  BMI 34.36 kg/m2    Gen: sitting in chair, alert, cooperative and in no acute distress  HEENT: normocephalic; oropharynx clear  Card: RRR, S1, S2, no murmurs. Sternal incision is healed   Resp: lungs clear to auscultation bilaterally, no wheezes or crackles  GI: abdomen soft, not-tender  MSK: normal muscle tone, 1 plus edema   Neuro: CX II-XII grossly in tact; ROM in all four extremities grossly in tact  Psych: alert and oriented x3; normal affect  Skin: warm, no suspicious rashes or lesions noted        Recent Labs:    4/24/17  WBC:  8.4, RBC:  3.05, HGB:  8.2, HCT:  27.9, MCV:  92, MCH:  26.9, MCHC:  29.4, RDW:  14.4, PLT: 266, MPV:  13.1, Glucose:  99, Ca:  8.8, BUN:  23, Cr:  1.44, GFR:  58, 48.      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  Follow-up with Cardiac Nurse in AM.  2.  Increase Fe Soy 325 mg 1 tab po bid -Post op anemia.  3.  Check HGB in 2 weeks for PMD Follow-up.          Electronically signed by  Sherri Coon MD, MD

## 2017-04-24 NOTE — MR AVS SNAPSHOT
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  Follow-up with Cardiac Nurse in AM.  2.  Increase Fe Soy 325 mg 1 tab po bid -Post op anemia.  3.  Check HGB in 2 weeks for PMD Follow-up.        Follow-ups after your visit        Who to contact     If you have questions or need follow up information about today's clinic visit or your schedule please contact [facility] directly at [phone].  Normal or non-critical lab and imaging results will be communicated to you by HandInScanhart, letter or phone within 4 business days after the clinic has received the results. If you do not hear from us within 7 days, please contact the clinic through BuzzSpice or phone. If you have a critical or abnormal lab result, we will notify you by phone as soon as possible.  Submit refill requests through BuzzSpice or call your pharmacy and they will forward the refill request to us. Please allow 3 business days for your refill to be completed.          Additional Information About Your Visit        HandInScanhart Information     BuzzSpice lets you send messages to your doctor, view your test results, renew your prescriptions, schedule appointments and more. To sign up, go to [email or web address] . Click on \"Log in\" on the left side of the screen, which will take you to the " "Welcome page. Then click on \"Sign up Now\" on the right side of the page.     You will be asked to enter the access code listed below, as well as some personal information. Please follow the directions to create your username and password.     Your access code is: [email or web address]  Expires: 2017  7:31 AM     Your access code will  in 90 days. If you need help or a new code, please call your [facility] clinic or [phone].        Care EveryWhere ID     This is your Care EveryWhere ID. This could be used by other organizations to access your [facility] medical records  [id number]        Your Vitals Were     Pulse Temperature Height BMI (Body Mass Index)          63 97.7  F (36.5  C) 5' 8\" (1.727 m) 34.36 kg/m2         Blood Pressure from Last 3 Encounters:   17 120/65   17 117/63   17 129/72    Weight from Last 3 Encounters:   17 226 lb (102.5 kg)   17 237 lb (107.5 kg)   17 230 lb (104.3 kg)              Today, you had the following     No orders found for display       Primary Care Provider Office Phone # Fax #    David MOURA Caridad [phone]       [place] [phone]        Thank you!     Thank you for choosing [facility]  for your care. Our goal is always to provide you with excellent care. Hearing back from our patients is one way we can continue to improve our services. Please take a few minutes to complete the written survey that you may receive in the mail after your visit with us. Thank you!             Your Updated Medication List - Protect others around you: Learn how to safely use, store and throw away your medicines at www.disposemymeds.org.          This list is accurate as of: 17 11:59 PM.  Always use your most recent med list.                   Brand Name Dispense Instructions for use    ALLOPURINOL PO      Take 100 mg by mouth       AMLODIPINE BESYLATE PO      Take 5 mg by " mouth       ascorbic acid 500 MG Cpcr CR capsule    vitamin C     Take 500 mg by mouth 3 times daily       ASPIRIN EC PO      Take 81 mg by mouth daily       ATENOLOL PO      Take 25 mg by mouth       ATORVASTATIN CALCIUM PO      Take 20 mg by mouth       DOCUSATE SODIUM PO      Take 100 mg by mouth 2 times daily       ferrous sulfate 325 (65 FE) MG tablet    IRON     Take 325 mg by mouth 2 times daily       LASIX PO      Take 40 mg by mouth 2 times daily       latanoprost 0.005 % ophthalmic solution    XALATAN     Place 1 drop into both eyes At Bedtime       NIACIN (ANTIHYPERLIPIDEMIC) PO      Take 250 mg by mouth 2 times daily       NovoLOG FLEXPEN 100 UNIT/ML injection   Generic drug:  insulin aspart      Inject Subcutaneous 3 times daily (with meals)       OMEPRAZOLE PO      Take 20 mg by mouth every morning       potassium chloride 20 MEQ Packet    KLOR-CON     Take 20 mEq by mouth daily       TAMSULOSIN HCL PO      Take 0.4 mg by mouth daily       traZODone 5 mg/ml Susp    DESYREL     Take 50 mg by mouth At Bedtime       TYLENOL PO      Take 650 mg by mouth every 4 hours as needed for mild pain or fever

## 2017-04-24 NOTE — PATIENT INSTRUCTIONS
Orders:  1.  Follow-up with Cardiac Nurse in AM.  2.  Increase Fe Soy 325 mg 1 tab po bid -Post op anemia.  3.  Check HGB in 2 weeks for PMD Follow-up.

## 2017-04-25 ENCOUNTER — OFFICE VISIT - HEALTHEAST (OUTPATIENT)
Dept: CARDIOLOGY | Facility: CLINIC | Age: 77
End: 2017-04-25

## 2017-04-25 DIAGNOSIS — Z95.1 S/P CABG (CORONARY ARTERY BYPASS GRAFT): ICD-10-CM

## 2017-04-25 ASSESSMENT — MIFFLIN-ST. JEOR: SCORE: 1701.01

## 2017-04-26 VITALS
SYSTOLIC BLOOD PRESSURE: 115 MMHG | RESPIRATION RATE: 18 BRPM | BODY MASS INDEX: 34.21 KG/M2 | DIASTOLIC BLOOD PRESSURE: 62 MMHG | WEIGHT: 225 LBS | TEMPERATURE: 97.7 F | OXYGEN SATURATION: 93 % | HEART RATE: 63 BPM

## 2017-04-26 NOTE — PROGRESS NOTES
[facility] DISCHARGE SUMMARY    PATIENT'S NAME: Almas Gaona  YOB: 1940  MEDICAL RECORD NUMBER:  [id number]    PRIMARY CARE PROVIDER AND CLINIC RESPONSIBLE AFTER TRANSFER: David Mclean [facility] [address]   / FER[place]ND[place]*     CODE STATUS/ADVANCE DIRECTIVES DISCUSSION:   CPR/Full code        Allergies   Allergen Reactions     Sulfamethoxazole      Trimethoprim        TRANSFERRING PROVIDERS: Sherri Coon MD, MD  DATE OF SNF ADMISSION:  April / 12 / 2017  DATE OF SNF (anticipated) DISCHARGE: April / 28 / 2017  DISCHARGE DISPOSITION: Non-[facility] Provider   Nursing Facility: [facility] stay 3/28/17 to 4/12/17.     Condition on Discharge:  Improving.  Function:  indep  Cognitive Scores: CPT 5.0    Equipment: no aids    DISCHARGE DIAGNOSIS:   1. Non-ST elevation myocardial infarction (NSTEMI) (H)    2. Postsurgical aortocoronary bypass status    3. Essential hypertension    4. Atherosclerosis of native coronary artery of native heart without angina pectoris    5. 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 Patient participated in PT/OT.  Patient will discharge home with outpatient Cardiac rehab at [facility].      PAST MEDICAL HISTORY:  has no past medical history on file.    DISCHARGE MEDICATIONS:  Current Outpatient Prescriptions   Medication Sig Dispense Refill     potassium chloride (KLOR-CON) 20 MEQ Packet Take 20 mEq by mouth daily       Acetaminophen (TYLENOL PO) Take 650 mg by mouth every 4 hours as needed for mild pain or fever       ASPIRIN EC PO Take 81 mg by mouth daily       DOCUSATE SODIUM PO Take 100 mg by mouth 2 times daily       ferrous sulfate (IRON) 325 (65 FE) MG tablet Take 325 mg by mouth 2 times daily        Furosemide (LASIX PO) Take 40 mg by mouth 2 times daily         latanoprost (XALATAN) 0.005 % ophthalmic solution Place 1 drop into both eyes At Bedtime       NIACIN, ANTIHYPERLIPIDEMIC, PO Take 250 mg by mouth 2 times daily       insulin aspart (NOVOLOG FLEXPEN) 100 UNIT/ML injection Inject Subcutaneous 3 times daily (with meals)       OMEPRAZOLE PO Take 20 mg by mouth every morning       traZODone (DESYREL) 5 mg/ml SUSP Take 50 mg by mouth At Bedtime       ALLOPURINOL PO Take 100 mg by mouth       ATENOLOL PO Take 25 mg by mouth       ATORVASTATIN CALCIUM PO Take 20 mg by mouth       TAMSULOSIN HCL PO Take 0.4 mg by mouth daily       AMLODIPINE BESYLATE PO Take 5 mg by mouth         MEDICATION CHANGES/RATIONALE:   none  Controlled medications sent with patient: not applicable/none     ROS:    10 point ROS of systems including Constitutional, Eyes, Respiratory, Cardiovascular, Gastroenterology, Genitourinary, Integumentary, Muscularskeletal, Psychiatric were all negative except for pertinent positives noted in my HPI.    Physical Exam:   Vitals: /62  Pulse 63  Temp 97.7  F (36.5  C)  Resp 18  Wt 225 lb (102.1 kg)  SpO2 93%  BMI 34.21 kg/m2  BMI= Body mass index is 34.21 kg/(m^2).    Gen: sitting in chair, alert, cooperative and in no acute distress  HEENT: normocephalic; oropharynx clear  Card: RRR, S1, S2, no murmurs; sternal incision c/d/i  Resp: lungs clear to auscultation bilaterally, no wheezes or crackles  GI: abdomen soft, not-tender  MSK: normal muscle tone, no trace  edema  Neuro: CX II-XII grossly in tact; ROM in all four extremities grossly in tact  Psych: alert and oriented x3; normal affect  Skin: warm, no suspicious rashes or lesions noted      DISCHARGE PLAN:  Outpatient Cardiac Rehab  Patient instructed to follow-up with:  PCP in 1-3 weeks       Current [place] scheduled appointments:  Future Appointments  Date Time Provider Department Center   4/27/2017 11:30 AM Sherri Coon MD [facility]       MT referral needed and placed  by this provider: No    Pending labs: none  SNF labs   Date:  4/24/17  Hgb 8.2, Hct 27.9, WBC 8.4    Date:  4/24/17  Na 139, K+ 4.6, BUN 23, Creat 1.44, eGFR 48  Ca 8.8    Blood Sugars-  4/25  135 & 226  4/26  105-174        Discharge Treatments    1. A1C 6.0 BS excellent, 20 lb weight loss, recommend no metformin needed, continue diabetic diet.  2. F/U with PMD 1-2 weeks to review BS and DM, check cbc and Hgb  3. F/U with Dr [facility] on 6/2/17 S/P CABG    TOTAL DISCHARGE TIME:   Greater than 30 minutes  Electronically signed by:  Sherri Coon MD, MD

## 2017-04-27 ENCOUNTER — NURSING HOME VISIT (OUTPATIENT)
Dept: GERIATRICS | Facility: CLINIC | Age: 77
End: 2017-04-27
Payer: COMMERCIAL

## 2017-04-27 DIAGNOSIS — E11.59 TYPE 2 DIABETES MELLITUS WITH OTHER CIRCULATORY COMPLICATION: ICD-10-CM

## 2017-04-27 DIAGNOSIS — I21.4 NON-ST ELEVATION MYOCARDIAL INFARCTION (NSTEMI) (H): Primary | ICD-10-CM

## 2017-04-27 DIAGNOSIS — Z95.1 POSTSURGICAL AORTOCORONARY BYPASS STATUS: ICD-10-CM

## 2017-04-27 DIAGNOSIS — I10 ESSENTIAL HYPERTENSION: ICD-10-CM

## 2017-04-27 DIAGNOSIS — I25.10 ATHEROSCLEROSIS OF NATIVE CORONARY ARTERY OF NATIVE HEART WITHOUT ANGINA PECTORIS: ICD-10-CM

## 2017-04-27 PROCEDURE — 99316 NF DSCHRG MGMT 30 MIN+: CPT | Performed by: FAMILY MEDICINE

## 2017-04-27 PROCEDURE — 99207 ZZC CDG-CORRECTLY CODED, REVIEWED AND AGREE: CPT | Performed by: FAMILY MEDICINE

## 2017-04-27 NOTE — MR AVS SNAPSHOT
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  Normal or non-critical lab and imaging results will be communicated to you by Leatthart, letter or phone within 4 business days after the clinic has received the results. If you do not hear from us within 7 days, please contact the clinic through Leatthart or phone. If you have a critical or abnormal lab result, we will notify you by phone as soon as possible.  Submit refill requests through Travee or call your pharmacy and they will forward the refill request to us. Please allow 3 business days for your refill to be completed.          Additional Information About Your Visit        LeattharGlobalWorx Information     Travee lets you send messages to your doctor, view your test results, renew your prescriptions, schedule appointments and more. To sign up, go to [email or web address] . Click on \"Log in\" on the left side of the screen, which will take you to the Welcome page. Then click on \"Sign up Now\" on the right side of the page.     You will be asked to enter the access code listed below, as well as some personal information. Please follow the directions to create your username and password.     Your " access code is: [email or web address]  Expires: 2017  7:31 AM     Your access code will  in 90 days. If you need help or a new code, please call your [facility] clinic or [phone].        Care EveryWhere ID     This is your Care EveryWhere ID. This could be used by other organizations to access your [facility] medical records  [id number]        Your Vitals Were     Pulse Temperature Respirations Pulse Oximetry BMI (Body Mass Index)       63 97.7  F (36.5  C) 18 93% 34.21 kg/m2        Blood Pressure from Last 3 Encounters:   17 115/62   17 120/65   17 117/63    Weight from Last 3 Encounters:   17 225 lb (102.1 kg)   17 226 lb (102.5 kg)   17 237 lb (107.5 kg)              Today, you had the following     No orders found for display       Primary Care Provider Office Phone # Fax #    David Mclean [phone]       [place] [phone]        Thank you!     Thank you for choosing [facility]  for your care. Our goal is always to provide you with excellent care. Hearing back from our patients is one way we can continue to improve our services. Please take a few minutes to complete the written survey that you may receive in the mail after your visit with us. Thank you!             Your Updated Medication List - Protect others around you: Learn how to safely use, store and throw away your medicines at www.disposemymeds.org.          This list is accurate as of: 17 11:59 PM.  Always use your most recent med list.                   Brand Name Dispense Instructions for use    ALLOPURINOL PO      Take 100 mg by mouth       AMLODIPINE BESYLATE PO      Take 5 mg by mouth       ASPIRIN EC PO      Take 81 mg by mouth daily       ATENOLOL PO      Take 25 mg by mouth       ATORVASTATIN CALCIUM PO      Take 20 mg by mouth       DOCUSATE SODIUM PO      Take 100 mg by mouth 2 times daily       ferrous sulfate  325 (65 FE) MG tablet    IRON     Take 325 mg by mouth 2 times daily       LASIX PO      Take 40 mg by mouth 2 times daily       latanoprost 0.005 % ophthalmic solution    XALATAN     Place 1 drop into both eyes At Bedtime       NIACIN (ANTIHYPERLIPIDEMIC) PO      Take 250 mg by mouth 2 times daily       NovoLOG FLEXPEN 100 UNIT/ML injection   Generic drug:  insulin aspart      Inject Subcutaneous 3 times daily (with meals)       OMEPRAZOLE PO      Take 20 mg by mouth every morning       potassium chloride 20 MEQ Packet    KLOR-CON     Take 20 mEq by mouth daily       TAMSULOSIN HCL PO      Take 0.4 mg by mouth daily       traZODone 5 mg/ml Susp    DESYREL     Take 50 mg by mouth At Bedtime       TYLENOL PO      Take 650 mg by mouth every 4 hours as needed for mild pain or fever

## 2021-05-30 VITALS
HEIGHT: 68 IN | BODY MASS INDEX: 36.27 KG/M2 | WEIGHT: 239.3 LBS | HEIGHT: 68 IN | BODY MASS INDEX: 36.27 KG/M2 | WEIGHT: 239.3 LBS | HEIGHT: 68 IN | WEIGHT: 239.3 LBS | BODY MASS INDEX: 36.27 KG/M2 | HEIGHT: 68 IN | BODY MASS INDEX: 36.27 KG/M2 | WEIGHT: 239.3 LBS

## 2021-05-30 VITALS — WEIGHT: 224.1 LBS | BODY MASS INDEX: 33.96 KG/M2 | HEIGHT: 68 IN

## 2021-06-09 NOTE — OP NOTE
DATE OF SERVICE: 04/05/2017    DATE OF OPERATION:  04/05/2017    OPERATION PERFORMED:  1.  Triple vessel coronary artery bypass grafting procedures; left internal mammary  artery to left anterior descending coronary artery and separate reversed saphenous  vein grafts to the lateral branch of the obtuse marginal and the distal right  coronary arteries.  2.  Endoscopic vein procurement from the left lower extremity.    SURGEONS:  Laila Marquis MD, attending surgeon; Polina LEON, first  assistant; Ramón Adam MD, resident surgeon; MARINA Starr.    ANESTHESIA:  General endotracheal.    SKIN PREP:  Betadine and DuraPrep.    INCISIONS:  Median sternotomy and skip incisions along the course of the greater  saphenous vein, left lower extremity.    DRAINS:  Two 36-[place] mediastinal, one 28-[place] left chest.    CULTURE:  None.    SPECIMEN:  None.    CLOSURE:  Routine.    PREOPERATIVE DIAGNOSES:  1.  Status post non-ST elevation myocardial infarction (NSTEMI).  2.  Normal pulmonary artery pressures.  3.  Preserved left ventricular function.  4.  Severe triple vessel coronary artery disease.    POSTOPERATIVE DIAGNOSES:    1.  Status post non-ST elevation myocardial infarction (NSTEMI).  2.  Normal pulmonary artery pressures.    3.  Preserved left ventricular function.    4.  Severe triple vessel coronary artery disease.    BRIEF HISTORY:  Mr. Gaona is a 76-year-old gentleman who presented last week with  a NSTEMI.  He was also noted last week to have thrombocytopenia.  Workup of his  thrombocytopenia delayed his operative procedure.  He has hit negative and he has  been off aspirin for 5 days.  This most recent platelet count is 107,000.  The above  procedure was planned.    FINDINGS AT OPERATION:  The patient's left internal mammary artery was encased in  fat, he had quite a bit of epicardial fat as well.  His ascending aorta was normal  in size and there was palpable plaque posteriorly.   His pulmonary artery pressures  were normal.  The distal right coronary artery was a 2 mm target vessel and  excellent vessel for bypass grafting.  The lateral branch of the obtuse marginal was  1.5 to 2 mm target vessel and excellent vessel for bypass grafting and the left  anterior descending coronary artery was diffusely diseased, but it was 2 mm at the  site of arteriotomy and a good vessel for bypass grafting.  The left internal  mammary artery was a 2 mm conduit with excellent flow.  The reversed saphenous vein  graft measured 4 to 5 mm in diameter and was of good quality.  His heart was  enlarged, contractility was preserved and he did have an element of left ventricular  hypertrophy.    PROCEDURE:  The patient arrived in the operating room and an arterial line was  placed.  Satisfactory general endotracheal anesthesia was induced.  An OG tube was  inserted followed by a transesophageal echo probe, a Saint Rose-Ree catheter and a Torres  catheter.  The patient's neck, chest, abdomen, both groins and lower extremities  were prepped and draped in a standard sterile fashion.  A complete median sternotomy  was made.  A Chevalier retractor was inserted and the left internal mammary artery  was taken down from the subclavian vein to the xiphoid process.  At the same time, a  small incision was made overlying the greater saphenous vein in the left upper leg  and approximately 7 cm of the vein was exposed circumferentially.  3000 units of IV  heparin was administered and the endoscope was passed proximally and distally along  the course of the greater saphenous vein.  The vein was mobilized circumferentially,  its branches were clipped or cauterized and it was clipped proximally and distally  and extracted.  It was then cannulated and distended, its branches controlled with  Ligaclips.  The leg wound was rendered hemostatic and then closed in layers using  running 2-0 and 3-0 Vicryl, as well as Dermabond on the  skin.    Heparin was administered.  The left internal mammary artery was prepared in the  usual fashion.  A Marques retractor was inserted.  The pericardium was opened and  tented up on pericardial sutures.  The aorta was  from the pulmonary  artery.  Pursestring sutures were placed in the ascending aorta and right atrium for  cannulation.  When the ACT was appropriate cannulation was performed and  cardiopulmonary bypass established.  The patient was allowed to drift.  A retrograde  cardioplegia catheter was inserted via the right atrium into the coronary sinus.   The aorta was crossclamped and 800 mL of cold blood cardioplegic solution was  administered antegrade and an additional 400 mL of cold blood cardioplegic solution  was administered retrograde.  A good arrest was achieved.  Cold saline or slush was  used intermittently during the period of aortic crossclamp.  Attention was first  turned to the distal right coronary artery, and this vessel was isolated between  Vesseloops and then dissected out for a distance of 1 cm and opened for a distance  of 1 cm.  It was bypassed in an end-to-side fashion using reversed saphenous vein  graft and running 7-0 Prolene.  Upon completion of this first distal anastomosis,  the vein graft was flushed with cold blood cardioplegic solution and sized to the  ascending aorta and an additional 300 mL of cold blood cardioplegic solution was  administered in a retrograde fashion.  Next, attention was turned to the lateral  wall of the heart where the bifurcating obtuse marginal was located.  Its lateral  branch was a better vessel for bypassing it was dissected out for a distance of 1 cm  and opened for a distance of 1 cm.  It was bypassed in an end-to-side fashion using  reversed saphenous vein graft and running 7-0 Prolene.  Upon completion of this 2nd  distal anastomosis, the vein graft was flushed with cold blood cardioplegic solution  and sized to the ascending aorta  and an additional 300 mL of cold blood cardioplegic  solution was administered in a retrograde fashion.  Finally, attention was turned to  the left anterior descending coronary artery in the intraventricular groove.  This  vessel was dissected out in its distal middle third for a distance of 1 cm and then  opened for a distance of 1 cm.  It was bypassed in an end-to-side fashion using the  left internal mammary artery and running 7-0 Prolene.  As this 3rd and last distal  anastomosis was being performed gentle warming was begun.  The grey occluder was  briefly released from the left internal mammary artery and good flow was seen down  the distribution of the left anterior descending coronary artery.  A pleural rent  was created for passage of the left internal mammary artery and the patient received  a final dose of cold blood cardioplegia in a retrograde fashion.  It had been  decided to perform the 2 proximal aortosaphenous anastomoses with the aortic  crossclamp in place; therefore two 4-mm punch aortotomies were created.  The 2  proximal aortosaphenous anastomoses were constructed in an end-to-side fashion using  running 6-0 Prolene.  A hot shot was delivered in a retrograde fashion and the grey  occluder was released from the left internal mammary artery.  The aortic crossclamp  was released, the aortic crossclamp time was 1 hour 6 minutes.  The patient did not  require defibrillation.  The proximal and distal anastomoses were examined and found  to be hemostatic.  Ventricular and atrial pacing wires were applied and the patient  was AV sequentially paced at a rate of 90.  The root vent was removed and that site  repaired with pledgeted 4-0 Prolene.  Gentle ventilation was once more begun.  The  patient was placed on low-dose epinephrine and Mehdi-Synephrine.  When he was warm the  heart was allowed to fill and eject and the patient  from cardiopulmonary  bypass without difficulty.  Total time on  cardiopulmonary bypass was 1 hour 19  minutes.  The patient was decannulated and the cannulation sites oversewn with 4-0  Prolene.  Protamine was administered to reverse the heparin.  Hemostasis was  satisfactory.  The patient did receive three 5-packs of platelets as well as  cryoprecipitate and DDAVP the mediastinum was drained with two 36-[place]  chest tubes and the left pleural space was drained with a 28-[place] chest  tube.  The sternum was approximated with a combination of #6 stainless steel sternal  wires as well as double wires.  The sternotomy wound was irrigated with warm  antibiotic containing solution.  It was closed in 4 layers using 2 layers of running  0 Vicryl and an additional layer of 2-0 Vicryl and a subcuticular stitch of 4-0  Monocryl.  Dermabond was applied to the skin.  The sponge, needle and instrument  counts were reported as correct.  The estimated blood loss was 600 mL.  Mr. Gaona  was brought to the intensive care unit in critical but stable condition having  tolerated the procedure well.      MD june RAMSEY 04/05/2017 15:41:45  T 04/05/2017 16:06:33  R 04/05/2017 16:09:40  [id number]        cc: BROOKLYNN AKHTAR MD

## 2021-06-09 NOTE — PROGRESS NOTES
"Spiritual Care Narrative Note    Spiritual Assessment:   made a pre-surgery visit with patient this morning. Patient preparing for heart surgery. Patient up in his chair when  arrives; no family present at this time. Patient spoke at length about his upcoming surgery, feeling nervous and scared, and being worried about the pain. Patient spent time reflecting on a friend of his who has gone through this same procedure and states, \"Well he is still alive so that's good.\"  explored with patient his hope for his surgery and patient simply states, \"I am hoping in the end I will be glad I had this surgery.\" No family present today but patient's states wife and family will be present tomorrow. Patient does seem concerned about the recovery portion of his surgery knowing he will not initially be able to return home. Patient did process this with patient as well. Despite his concerns patient seems ready for surgery;  notes no immediate concerns.     Care Provided/ Plan of Care:    shared prayer with patient, offered support, encouragement, and words of comfort.  will continue to follow as a member of patient's care team.    CHRISTIANO Triplett, BCC            "

## 2021-06-09 NOTE — PROGRESS NOTES
"RESPIRATORY CARE NOTE     Patient Name: Almas Gaona  Today's Date: 4/5/2017     Pt continues on the following settings:  Vent Mode: CPAP/PSV  FiO2 (%):  [40 %-60 %] 40 %  S RR:  [16] 16  S VT:  [525 mL] 525 mL  PEEP/CPAP (cm H2O):  [5 cm H2O] 5 cm H2O  Minute Ventilation (L/min):  [9.7 L/min-15 L/min] 15 L/min  PIP:  [10 cm H2O-33 cm H2O] 10 cm H2O  KS SUP:  [5 cm H20] 5 cm H20  MAP (cm H2O):  [7-9] 7      Pt is intubated with  # 7.5 ETT secured 22 at the teeth. Pt weaned today on PS of 5, peep 5, FiO2 40% for a total of 15 minutes. Pt placed back on VCV mode due to apnea.  Pt's respiratory status is stable. RT will continue to follow per MD's orders.     /59  Pulse 74  Temp 96.8  F (36  C) (Core)   Resp 19  Ht 5' 8\" (1.727 m)  Wt (!) 230 lb 14.4 oz (104.7 kg)  SpO2 100%  BMI 35.11 kg/m2      Ta Escoto, LRT  "

## 2021-06-09 NOTE — PROGRESS NOTES
"Progress Note    Assessment/Plan  S/P CABG X 3 POD # 4  Awake and alert, OOB to chair  AVSS, A-fib with RVR, now sinus with PVC  per monitor   Denies chest pain, but very short of breath with activities and has inspiratory wheezes.   SONIA, Cr 1.9 up from 1.8 yesterday. Given the SONIA, Lisinopril is discontinued, however given the fluid status, we will continue diuresis and repeat BMP in the AM  Complaint of neck stiffness, started Flexeril PRN therapy, but will give it scheduled for the next 24 hours  Recent NSTEMI, stable postop  DM stable on SSI  Thrombocytopenia, requiring both pre-op and postop platelet transfusions, Plt 198 up from 147 yesterday  Acute blood loss anemia, Hgb 7.8, will continue to monitor closely as patient is hemodynamically stable, while continuing Fe therapy   Incisions CDI, lung sounds diminished in the base with inspiratory wheezes  Abdomen obese, soft and non tender  Wt 110.8 kg, up from 109.8 yesterday pre-op wt 110.9 kg  Bilateral 1+ pitting LE edema  Will continue gentle diuresis as noted  above  Continue pulmonary toilet and mobilization as tolerated  BMP in the AM  Home with family at discharge in 1-2 days    Subjective  Denies chest pain, complaint of stiff neck, short of breath with activities  Objective  Awake and alert, OOB to chair, look tense  Vital signs in last 24 hours  Temp:  [97.6  F (36.4  C)-99.7  F (37.6  C)] 98.1  F (36.7  C)  Heart Rate:  [] 69  Resp:  [18-21] 20  BP: (107-140)/(56-75) 124/66  Weight:   (!) 244 lb 5 oz (110.8 kg)    Intake/Output last 3 shifts  I/O last 3 completed shifts:  In: 2391 [P.O.:1837; I.V.:554]  Out: 925 [Urine:925]  Intake/Output this shift:  I/O this shift:  In: 480 [P.O.:480]  Out: -     Review of Systems   A 12 point comprehensive review of systems was negative except as noted.    Physical Exam  /66 (Patient Position: Sitting)  Pulse 69  Temp 98.1  F (36.7  C) (Oral)   Resp 20  Ht 5' 8\" (1.727 m)  Wt (!) 244 lb 5 oz (110.8 " kg) Comment: nurse aware  SpO2 96%  BMI 37.15 kg/m2  A-fib, now sinus with PVC  Incisions CDI, lung sounds diminished in the bases  Abdomen obese, soft and non tender  Bilateral LE pitting edema    Pertinent Labs   Lab Results: personally reviewed.   Lab Results   Component Value Date     04/09/2017    K 4.6 04/09/2017     04/09/2017    CO2 20 (L) 04/09/2017    BUN 59 (H) 04/09/2017    CREATININE 1.99 (H) 04/09/2017    CALCIUM 7.9 (L) 04/09/2017     Lab Results   Component Value Date    WBC 8.4 04/09/2017    HGB 7.8 (L) 04/09/2017    HCT 25.3 (L) 04/09/2017    MCV 95 04/09/2017     04/09/2017       Pertinent Radiology   Radiology Results: Not yet available for review  EKG Results: personally reviewed.  and A-fib, now sinus with PVC    Vivek Ryan

## 2021-06-09 NOTE — PROGRESS NOTES
Pt does not have his CPAP machine here & does not want to wear hospital unit.  He stated it doesn't fit him correctly. Would like to wear NC instead. Currently on 2L NC SO2 94%.  Sandra Krishnamurthy

## 2021-06-09 NOTE — PROGRESS NOTES
Progress Note    Brief summary:   76 y.o. old male with history of hypertension, dyslipidemia, diabetes mellitus, history of CAD, had presented emergency with complaint of chest pain, found to have an NSTEMI with elevated troponin, underwent coronary angiogram which showed multiple vessel disease.CABG postponed secondary to thrombocytopenia. Hematology suggested likely myelodysplasia or mild ITP and cleared him for CABG. He underwent CABG X 3 on 4/5/17 with Dr. Marquis.    Assessment/Plan  Principal Problem:    NSTEMI (non-ST elevated myocardial infarction)  Active Problems:    Hypertension    Dyslipidemia    CAD (coronary artery disease)    Type 2 diabetes mellitus    Thrombocytopenia    Coronary artery disease due to lipid rich plaque    Essential hypertension    S/P CABG x 3    #S/p CABG X 3 POD1: currently intubated, mx per CVS and intensivist     #NSTEMI (non-ST elevated myocardial infarction); CAD (coronary artery disease);s/p cardiac cath : Severe multivessel disease, including  of RCA; s/p CABG X 3. On atenolol, lipitor, losartan      #Thrombocytopenia: cleared for CABG by hematology. ASA on hold. Heme recommends a unit of platelets just prior to procedure if needed and as needed afterwards.       #Essential Hypertension: controlled on Norvasc, Atenolol, Hyzaar      #Dyslipidemia: Continue Lipitor      #Type 2 diabetes mellitus: metformin on hold. Continue qid BG checks. Sugars well controlled without any treatment. A1c 3/28 was 6.0. Was on insulin drip, now switched to lantus    #SLOAN on home CPAP at night    Subjective  Patient seen and examined  Mild soreness at the surgical site  No other issues    Objective    Vital signs in last 24 hours  Temp:  [98.3  F (36.8  C)-99.5  F (37.5  C)] 98.3  F (36.8  C)  Heart Rate:  [71-82] 73  Resp:  [16-23] 20  BP: ()/(56-69) 151/69  Arterial Line BP: ()/(44-53) 116/48  FiO2 (%):  [24 %-35 %] 24 %  Weight:   218 lb 9.6 oz (99.2 kg)    Intake/Output last 3  shifts  I/O last 3 completed shifts:  In: 2157.8 [P.O.:480; I.V.:1227.8; IV Piggyback:450]  Out: 1836 [Urine:1016; Chest Tube:820]  Intake/Output this shift:       Physical Exam  General appearance:alert, not in distress  Eyes: conjunctivae/corneas clear. PERRL, EOM's intact. Fundi benign.  Lungs: clear to auscultation bilaterally  Heart: regular rate and rhythm, S1, S2 normal, no murmur, click, rub or gallop  Abdomen: soft, non-tender; bowel sounds normal; no masses, no organomegaly  Extremities: extremities normal, atraumatic, no cyanosis or edema    Meds    ascorbic acid (vitamin C)  500 mg Oral BID     [START ON 4/8/2017] bisacodyl  10 mg Rectal Once     docusate sodium  100 mg Oral BID     ferrous sulfate  325 mg Oral BID with meals     insulin aspart (NovoLOG) injection   Subcutaneous TID with meals     insulin aspart (NovoLOG) injection   Subcutaneous QHS     [START ON 4/7/2017] magnesium hydroxide  30 mL Oral DAILY     melatonin  3 mg Oral QHS     metFORMIN  500 mg Oral BID with meals     metoprolol tartrate  12.5 mg Oral BID     mupirocin  1 application Nasal BID     omeprazole  20 mg Oral Daily before brkfst     polyethylene glycol  17 g Oral DAILY     tamsulosin  0.4 mg Oral Daily after supper       Pertinent Labs   Lab Results: personally reviewed.   not applicable      Results from last 7 days  Lab Units 04/06/17 0413 04/05/17  1152 04/05/17  0435 04/04/17  1039   LN-SODIUM mmol/L 143 140  --  139   LN-POTASSIUM mmol/L 4.5 4.5 4.3 4.1   LN-CHLORIDE mmol/L 113* 113*  --  104   LN-CO2 mmol/L 21* 20*  --  26   LN-BLOOD UREA NITROGEN mg/dL 24 22  --  25   LN-CREATININE mg/dL 1.53* 1.07  --  1.38*   LN-CALCIUM mg/dL 7.5* 7.6*  --  9.6           Results from last 7 days  Lab Units 04/06/17 0413 04/05/17  1152 04/05/17  0435 04/03/17  0547   LN-WHITE BLOOD CELL COUNT thou/uL 9.2 13.2*  --  7.4   LN-HEMOGLOBIN g/dL 7.8* 8.7* 11.9* 11.9*   LN-HEMATOCRIT % 25.4* 27.9*  --  37.6*   LN-PLATELET COUNT thou/uL 126*  138* 107* 106*         Pertinent Radiology   Radiology Results: Personally reviewed impression/s    Xr Chest Ap Portable    Result Date: 4/6/2017  XR CHEST AP PORTABLE 4/6/2017 4:46 AM INDICATION: S/P CABG X 3 COMPARISON: 04/05/2017 at 1300 hours FINDINGS: Endotracheal tube has been removed. A [place] catheter terminates in the main PA. Subxiphoid drains remain. Small left greater than right bibasilar opacities likely represent atelectasis and pleural fluid; underlying airspace disease is not excluded. There is slightly increased edema from the prior study. The cardiomediastinal silhouette is enlarged.    Xr Chest Ap Portable    Result Date: 4/5/2017  XR CHEST AP PORTABLE 4/5/2017 11:59 AM INDICATION: S/P CABG X 3 COMPARISON: 3/28/2017 FINDINGS: A new endotracheal tube tip terminates 4.5 cm from the francine. A right IJ sheath transmits a PA catheter which terminates in the main pulmonary artery. Subxiphoid drains are present. Left basilar opacities likely represent atelectasis, though a  small 1 mL pleural fluid or airspace disease cannot be excluded. There is mild edema. The cardiomediastinal silhouette is enlarged. Sternotomy suture wires are intact.    Xr Chest Pa And Lateral    Result Date: 3/28/2017  XR CHEST PA AND LATERAL 3/28/2017 4:06 PM INDICATION: preop CABG COMPARISON: 3/28/2017 at 0337 hours FINDINGS: No pleural fluid or pneumothorax. There are no focal airspace opacities. Heterogeneous likely calcified opacities in the midlungs are nonspecific though could represent nodules, pleural calcifications, or musculoskeletal opacities. Recommend comparison with any prior studies. If none are available, nonemergent chest CT is recommended. There is no significant edema. The cardiomediastinal silhouette is at the upper limits of normal size.    Us Carotid Bilateral    Result Date: 3/28/2017  US CAROTID BILATERAL 3/28/2017 6:19 PM INDICATION: CAD preop CABG TECHNIQUE: Duplex exam performed utilizing 2D  gray-scale imaging, Doppler interrogation with color-flow and spectral waveform analysis. COMPARISON: None. FINDINGS: RIGHT: There is mild atheromatous plaque. Normal waveforms with no significant stenosis. LEFT: There is mild atheromatous plaque. Normal waveforms with no significant stenosis. There is abnormal waveform involving the right vertebral artery, with the appearance that of complete subclavian steal. The right subclavian artery waveform is normal. The left vertebral arteries and subclavian artery waveforms are normal. VELOCITY CHART: The following velocities were obtained in the RIGHT carotid system. CCA: 99/12 cm/s ICA: 99/25 cm/s ECA: 118/16 cm/s ICA/CCA: PS 0.99 The following velocities were obtained in the LEFT carotid system. CCA: 101/20 cm/s ICA: 90/29 cm/s ECA: 130/17 cm/s ICA/CCA: PS 0.89                            CONCLUSION: 1.  Mild atheromatous plaque in the carotid arteries. 2.  No significant stenosis in the carotid arteries. 3.  Abnormal right vertebral artery waveform, with the appearance suggestive of complete subclavian steal. Evaluation based on velocities and NASCET criteria. NOTE: ABNORMAL REPORT THE DICTATION ABOVE DESCRIBES AN ABNORMALITY FOR WHICH FOLLOW-UP IS NEEDED.     Us Abdomen Limited    Result Date: 3/31/2017  US ABDOMEN LIMITED 3/31/2017 6:09 PM INDICATION: thrombocytopenia, to assess spleen size TECHNIQUE: Routine. COMPARISON: None. FINDINGS: The spleen is normal size measuring 9.8 x 4.8 x 3.6 cm. No splenic lesion seen.         Advanced Care Planning:  Discharge Planning discussed with patient  Anticipated LOS: TBD  Barrier to discharge:post op recovery  Disposition:likely home  Discussed care with patient for >25 minutes with greater than 50% of time spent in counseling and coordination of care.      Delmy Diaz MD  Hospitalist  [phone]

## 2021-06-09 NOTE — PROGRESS NOTES
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  : Hematologic follow-up    Severe CAD  ; Patient on high-dose Lipitor  ; Continue atenolol losartan  ;Check lipid panel : Triglyceride of 419  hemoglobin A1c 6    Mild elevated BNP  ; Does not appear in heart failure at present  ; Lungs clear, saturating well on room air  ; Check echo: EF of 55%      Essential hypertension  ; Continue home medication  ; Blood pressure appears controlled at present     History of dyslipidemia  ; High-dose Lipitor      History of diabetes mellitus  ; Hold metformin  ; Check hemoglobin A1c 6  ; Insulin scale coverage     History of sleep apnea  : CPAP at night     Body mass index is 37.17 kg/(m^2).        DVT Prophylaxis: SCDs    Subjective:   Patient has been asymptomatic since admission  Patient updated on current platelet count    Patient states he has never been told of any low platelet count in the past.  He does not remember why he had multiple myeloma workup in the past      Brief History: 76 y.o. old male with history of hypertension, dyslipidemia, diabetes mellitus, history of CAD, had presented emergency with complaint of chest pain, found to have an NSTEMI with elevated troponin, underwent coronary angiogram which showed multiple  "vessel disease and is planned for CABG.  CABG postponed secondary to thrombocytopenia    Chart reviewed, events noted. Pt seen and examined.     Objective     Vital signs in last 24 hours:  Temp:  [97.7  F (36.5  C)-98.5  F (36.9  C)] 97.7  F (36.5  C)  Heart Rate:  [56-70] 62  Resp:  [18-20] 20  BP: (118-149)/(59-81) 138/77  Weight:   (!) 232 lb 14.4 oz (105.6 kg)  Weight change: 9.6 oz (0.272 kg)  Body mass index is 35.41 kg/(m^2).    Intake/Output last 3 shifts:  I/O last 3 completed shifts:  In: 880 [P.O.:880]  Out: -   Intake/Output this shift:         Physical Exam:  /77 (Patient Position: Sitting)  Pulse 62  Temp 97.7  F (36.5  C) (Oral)   Resp 20  Ht 5' 8\" (1.727 m)  Wt (!) 232 lb 14.4 oz (105.6 kg)  SpO2 97%  BMI 35.41 kg/m2    FiO2 (%): 21 % O2 Device: None (Room air) O2 Flow Rate (L/min): 2 L/min    General Appearance:    Alert, no apparent distress.    HEENT:    Normocephalic. Pupils equal and reactive. No scleral   Icterus.    Lungs:     Clear to auscultation bilaterally, respirations unlabored   Heart::    Regular rate and rhythm, S1 and S2 normal, no murmur, rub   or gallop.    peripheral edema.   Abdomen:   Soft, Non tender. Non distended. Bowel sounds present.    Extremity :   No deformity   Pulses:   2+ and symmetric all extremities   CNS:   Alert and oriented, no facial asymmetry  No focal neurologic deficits         Imaging:  personally reviewed.      Scheduled Meds:    amLODIPine  5 mg Oral DAILY     atenolol  25 mg Oral DAILY     atorvastatin  80 mg Oral DAILY     latanoprost  1 drop Both Eyes QHS     losartan-hydrochlorothiazide (HYZAAR) 100-12.5 mg combo dose (INP)   Oral DAILY     mupirocin  1 application Nasal BID     sodium chloride  3 mL Intravenous Line Care     tamsulosin  0.4 mg Oral Daily after supper     Continuous Infusions:    nacl 0.9% 25 mL/hr (03/31/17 0600)     PRN Meds:.acetaminophen, dextrose 50 % (D50W), docusate sodium, glucagon (human recombinant), lidocaine, " morphine  injection, naloxone **OR** naloxone, nitroglycerin, oxyCODONE    Lab Results:  personally reviewed.   not applicable  Recent Results (from the past 24 hour(s))   POCT Glucose    Collection Time: 03/31/17  5:28 PM   Result Value Ref Range    Glucose, POC 94 mg/dL   Morphology,Smear Review (MORP)    Collection Time: 03/31/17  7:22 PM   Result Value Ref Range    Pathology, Smear Review See Separate Pathology Report (!) (none)    WBC 7.0 4.0 - 11.0 thou/uL    RBC 4.34 (L) 4.40 - 6.20 mill/uL    Hemoglobin 12.9 (L) 14.0 - 18.0 g/dL    Hematocrit 39.3 (L) 40.0 - 54.0 %    MCV 91 80 - 100 fL    MCH 29.7 27.0 - 34.0 pg    MCHC 32.8 32.0 - 36.0 g/dL    RDW 13.9 11.0 - 14.5 %    Platelets 103 (L) 140 - 440 thou/uL    MPV 14.7 (H) 8.5 - 12.5 fL   HIV Antigen/Antibody Screening Cascade    Collection Time: 03/31/17  7:22 PM   Result Value Ref Range    HIV Antigen / Antibody Negative Negative   Reticulocytes    Collection Time: 03/31/17  7:22 PM   Result Value Ref Range    Retic Absolute Count 0.079 0.010 - 0.110 mill/uL   Vitamin B12    Collection Time: 03/31/17  7:22 PM   Result Value Ref Range    Vitamin B-12 525 213 - 816 pg/mL   Manual Differential    Collection Time: 03/31/17  7:22 PM   Result Value Ref Range    Total Neutrophils % 67 50 - 70 %    Lymphocytes % 24 20 - 40 %    Monocytes % 4 2 - 10 %    Eosinophils %  5 0 - 6 %    Basophils % 2 0 - 2 %    Total Neutrophils Absolute 4.7 2.0 - 7.7 thou/ul    Lymphocytes Absolute 1.6 0.8 - 4.4 thou/uL    Monocytes Absolute 0.2 0.0 - 0.9 thou/uL    Eosinophils Absolute 0.4 0.0 - 0.4 thou/uL    Basophils Absolute 0.1 0.0 - 0.2 thou/uL    Toxic Granulation 1+ (!) Negative    Platelet Estimate Decreased (!) Normal    Ovalocytes 1+ (!) Negative    Polychromasia 1+ (!) Negative   Folate, Serum    Collection Time: 04/01/17  7:04 AM   Result Value Ref Range    Folate 10.6 >=3.5 ng/mL   Magnesium    Collection Time: 04/01/17  7:04 AM   Result Value Ref Range    Magnesium 1.9  1.8 - 2.6 mg/dL   Potassium - Next AM    Collection Time: 04/01/17  7:04 AM   Result Value Ref Range    Potassium 4.4 3.5 - 5.0 mmol/L   HM1 (CBC with Diff)    Collection Time: 04/01/17  7:04 AM   Result Value Ref Range    WBC 7.1 4.0 - 11.0 thou/uL    RBC 4.08 (L) 4.40 - 6.20 mill/uL    Hemoglobin 12.0 (L) 14.0 - 18.0 g/dL    Hematocrit 37.6 (L) 40.0 - 54.0 %    MCV 92 80 - 100 fL    MCH 29.4 27.0 - 34.0 pg    MCHC 31.9 (L) 32.0 - 36.0 g/dL    RDW 13.8 11.0 - 14.5 %    Platelets 97 (L) 140 - 440 thou/uL    MPV 13.5 (H) 8.5 - 12.5 fL    Neutrophils % 65 50 - 70 %    Lymphocytes % 20 20 - 40 %    Monocytes % 8 2 - 10 %    Eosinophils % 7 (H) 0 - 6 %    Basophils % 1 0 - 2 %    Neutrophils Absolute 4.6 2.0 - 7.7 thou/uL    Lymphocytes Absolute 1.4 0.8 - 4.4 thou/uL    Monocytes Absolute 0.6 0.0 - 0.9 thou/uL    Eosinophils Absolute 0.5 (H) 0.0 - 0.4 thou/uL    Basophils Absolute 0.1 0.0 - 0.2 thou/uL   POCT Glucose    Collection Time: 04/01/17  7:46 AM   Result Value Ref Range    Glucose,  mg/dL   POCT Glucose    Collection Time: 04/01/17 11:44 AM   Result Value Ref Range    Glucose,  mg/dL   Platelet Product Information    Collection Time: 04/01/17  2:19 PM   Result Value Ref Range    Status Canceled     Component Platelets        Results from last 7 days  Lab Units 04/01/17  1144 04/01/17  0746 03/31/17  1728 03/31/17  1153 03/30/17  1751 03/30/17  1156 03/30/17  0747 03/29/17  1703 03/29/17  1207 03/29/17  0654   LN-POC GLUCOSE FINGERSTICK- HE mg/dL 139 106 94 122 87 121 124 98 96 116           Advance Care Planning:   Barriers to discharge: Awaiting CABG  Anticipated discharge day: 2-3 days  Disposition: Likely home      Yarelis Sears MD  [facility]

## 2021-06-09 NOTE — PROGRESS NOTES
Pt converted to NSR with frequent PVCs, trigeminal at times. Heart rate in 60s. Updated CV surgery.  Will continue to monitor and continue amio drip as per orders.     Sol Velarde RN

## 2021-06-09 NOTE — PROGRESS NOTES
"  Clinical Nutrition Therapy Assessment Note      Reason for Assessment:   Almas Gaona is a 76 y.o. male assessed by the registered Dietitian for length of stay screen. Dx: NSTEMI. PMH: HTN, HLD, T2DM, SLOAN    Nutrition History:  Recent food/fluid intake has been adequate per risk screen PTA. Patient has been consuming the following supplements none . Patient has the following cultural/Latter-day food needs or preferences: none noted  Patient has the following food allergies or intolerances:nkfa    Current Nutrition Prescription:   Diet: cardiac  Supplements and Modulars:   Flush Orders:   Propofol Orders:  IV dextrose or Fluids:  [START ON 4/5/2017] aminocaproic acid 10 grams (0.71 g/ml)    [START ON 4/5/2017] dexmedetomidine 400 mcg/100 mL in NS (PRECEDEX) (4mcg/mL)    [START ON 4/5/2017] DOPamine    [START ON 4/5/2017] epinephrine    [START ON 4/5/2017] insulin regular infusion 0.5 unit/mL    [START ON 4/5/2017] niCARdipine    [START ON 4/5/2017] norepinephrine IV infusion in D5W    nacl 0.9% Last Rate: 25 mL/hr (03/31/17 0600)   nacl 0.9%    [START ON 4/5/2017] vasopressin 100 units/100 ml (PITRESSIN)in D5W (1 unit/ml)        Current Nutrition Intake:  The patient's current meal intake is good > 75%     Anthropometrics:  Height: 5' 8\" (172.7 cm)  Admission weight:  Weight: (!) 230 lb 4.8 oz (104.5 kg)  BMI (Calculated): 37.2  BMI indication: 35-39.9 obesity (class 2)  Usual body weight :  Wt Readings from Last 3 Encounters:   04/04/17 (!) 230 lb 4.8 oz (104.5 kg)     % Usual body weight n/a  Weight History:no weight changes reported per risk screen    Physical Findings:  The patient has the following physical signs which could indicate malnutrition: none noted       GI Status/Output:   The patient's GI symptoms include: constipation. Last BM per nursing 3/29/17    Bowel Sounds present    Skin/Wound:  Chaparro score Chaparro Scale Score: 23    Medications:  Medications reviewed.    Labs:  Labs reviewed:    Results " from last 7 days  Lab Units 04/03/17  0547   LN-WHITE BLOOD CELL COUNT thou/uL 7.4   LN-HEMOGLOBIN g/dL 11.9*   LN-HEMATOCRIT % 37.6*   LN-PLATELET COUNT thou/uL 106*       Results from last 7 days  Lab Units 04/04/17  1039   LN-SODIUM mmol/L 139   LN-POTASSIUM mmol/L 4.1   LN-CHLORIDE mmol/L 104   LN-CO2 mmol/L 26   LN-BLOOD UREA NITROGEN mg/dL 25   LN-CREATININE mg/dL 1.38*   LN-CALCIUM mg/dL 9.6       Results from last 7 days  Lab Units 03/31/17  0828   LN-ALBUMIN g/dL 3.5   LN-PROTEIN TOTAL g/dL 7.6   LN-BILIRUBIN TOTAL mg/dL 0.4   LN-ALKALINE PHOSPHATASE U/L 80   LN-ALT (SGPT) U/L 28   LN-AST (SGOT) U/L 33               Results from last 7 days  Lab Units 04/04/17  0712   LN-MAGNESIUM mg/dL 2.0           Results from last 7 days  Lab Units 04/04/17  1039   LN-GLUCOSE mg/dL 124          Accuchecks: 91-    Assessed Nutritional Needs:  Assessment weight is 70 kg, with a weight source of ideal weight    Estimated Energy Needs: 9107-8511 kcals daily per 22-25 kcal/kg     Estimated Protein Needs: 70-85 g daily, 1.0-1.2 g/kg.    Estimated Fluid Needs: per MD     Malnutrition Assessment: does not meet critieria    Nutrition Dx: ( obesity AEB BMI)  Intervention:continue current diet  Goals:  BG to remain < 180, > 70  Monitor:  Intake, BG, need to add diabetic diet    Nutrition Risk Level: low risk    See Care Plan for Problems, Goals, and Interventions.

## 2021-06-09 NOTE — PROGRESS NOTES
"Pt went into Afib with RVR in the 130s. Pt denies SOB, palpitations, light-headedness, or dizziness. He does not appear in any acute distress. He states he just feels achy and \"crappy\" which is unchanged per his report as compared to prior going into this arrhythmia. MARINA Rincon from CV surgery notified. Will continue to monitor and await any new orders.     Sol Velarde RN    "

## 2021-06-09 NOTE — PROGRESS NOTES
CV Surgery  CAB tomorrow at 0800 with Dr Marquis  He has no questions or concerns  Consent is signed and in his chart  Pre op orders entered, please release    Mindy Willingham CNP  Pager [phone]

## 2021-06-09 NOTE — PROGRESS NOTES
Progress Note    Assessment/Plan  Status multivessel CAD  Denies chest pain and or shortness of breath  Carotid US showed no significant stenosis  UA showed some bacteria, but negative nitrates   Awaiting UC&S  Plan is urgent CABG in AM with Dr Kandis Ryan

## 2021-06-09 NOTE — PROGRESS NOTES
Chart check only:    Patient with planned CAB due to multivessel disease.  Now postponed due to persistent thrombocytopenia.  He is stable without chest pain.  Awaiting hematology consultation.  No current changes per cardiology.    Chaparrita Schroeder MD

## 2021-06-09 NOTE — BRIEF OP NOTE
Coronary angiogram    4 Fr right radial access    Severe multivessel disease, including  of RCA    See procedure note for details

## 2021-06-09 NOTE — PROGRESS NOTES
"Progress Note  CV Surg   POD # 5  Hgb a1c 6.0    Subjective  \"I feel like I have to work to catch my breath\"  \"I haven't had much of a BM\"  \"My neck is stiff\"   \"My butt gets sore from sitting so much\"    Objective  Up in chair, NAD  Was asleep when entered room, Resp Rate regular, non labored    Vital signs in last 24 hours  /74 (Patient Position: Sitting)  Pulse 75  Temp 97.8  F (36.6  C) (Oral)   Resp 20  Ht 5' 8\" (1.727 m)  Wt (!) 241 lb 12.8 oz (109.7 kg)  SpO2 96%  BMI 36.77 kg/m2  O2 Sat's on 1 l/nc    Weight:   (!) 241 lb 12.8 oz (109.7 kg)  Yesterday 110.8  kg  Admit 110.9  kg      Intake/Output this shift:  Urine output 750 cc/noc and 3,700cc/24 hours  Stool LBM + very small,    Ambulation: 130-60 ft    Physical Exam  Neuro: no gross neuro deficits  Lungs: crackles noted L base  Cor: SR with occ PVC  INC: healing  ABD: large round, BS+, +flatus. + small BM  EXT: edema with sock indentations, pitting edema      Pertinent Labs   Lab Results: personally reviewed.   Lab Results   Component Value Date     04/10/2017    K 4.3 04/10/2017     04/10/2017    CO2 22 04/10/2017    BUN 56 (H) 04/10/2017    CREATININE 1.66 (H) 04/10/2017    CALCIUM 7.8 (L) 04/10/2017     Lab Results   Component Value Date    WBC 9.9 04/10/2017    HGB 7.7 (L) 04/10/2017    HCT 24.9 (L) 04/10/2017    MCV 94 04/10/2017     04/10/2017     Mag 3.1  INR 1.19    Assessment  1. NSTEMI S/P CABG x 3- asa, lipitor, BB, heparin  2. Acute resp failure-crackles L base - supplemental O2 1L/nc  3. Type 2 DM- glucoses 106-150  4. Thrombocytopenia -required pre -op and post op platelets- plts. Now 219   5. HTN- Atenolol 25 mg   6. Dyslipidemia - lipitor  7. Acute kidney injury- creat improving 1.6, yesterday 1.8  8. Atrial fib - amiodarone, BB  9. Acute blood loss anemia - Hgb remains low 7.7, yesterday 7.8 - Fe therapy  10. Urinary retention- balderrama re inserted 4/9 - on Flowmax,     PLAN  Continue diuresis - lasix x 24 " hours  Recheck CXR  Keep balderrama today, consider another voiding trial closer to dc possibly tomorrow.  Encourage ambulation and IS  Neck discomfort not much improvement with scheduled flexeril, will try lidocaine patch.     Labs BMP, hgb, CXR

## 2021-06-09 NOTE — PROGRESS NOTES
RESPIRATORY CARE NOTE     Patient Name: Almas Gaona  Today's Date: 4/5/2017       Resp Care: patient extubated successfully without any complication after 99 min of PS trials at 1724. Patient placed on 2L NC post-extubation, SpO2 in high 90s, BS clear throughout, RR 20 bpm. Patient has no stridor post-extubation, no SOB or respiratory distress. Will follow ABG in 1hr. Will continue to monitor.         Leyla Montoya, LRT

## 2021-06-09 NOTE — PROGRESS NOTES
Progress Note          SUBJECTIVE :     No cp  No fever  Creatinine trending up    PLAN :     - monitor renal functions    - wean off oxygen    - not yet medically ready for discharge.          Barriers to Discharge : elevated creatinine, stable HR  Anticipated discharge : TBD, 2 days ?  Disposition : home with family      TIME SPENT : Total time spent > 35 minutes and > 50% time spent on counseling patient about plan of care and coordination of care.           BRIEF HOSPITAL COURSE :               ASSESSMENT :          1. S/p CABG X 3    2. Post op acute hypoxic respiratory failure : on 2 liters of oxygen    3. Post op Pulmonary congestion : on lasix    4. Post op a fib : on amio infusion    5. Acute on CKD stage 3    6. NSTEMI (non-ST elevated myocardial infarction); CAD (coronary artery disease);s/p cardiac cath : Severe multivessel disease, including  of RCA; s/p CABG X 3. On atenolol, lipitor, losartan    7. Anemia, chronic with acute blood loss    8. Thrombocytopenia: cleared for CABG by hematology.     9. Essential Hypertension: controlled on Norvasc, Atenolol, Hyzaar    10. Chronic Medical Issues : Dyslipidemia: Continue Lipitor    11. Type 2 diabetes mellitus: metformin on hold. Continue qid BG checks. Sugars well controlled without any treatment. A1c 3/28 was 6.0. Was on insulin drip, now switched to lantus      12. SLOAN on home CPAP at night        Consults and Recommendations :            Diet :  Diabetic - cardiac   , IV fluids :  none   , IV Meds :    , Antibiotics     QTc :  DVT Prophylaxis : heparin  Code Status : Full  Admit status : inpatient  Orders reviewed    Main Concerning Issues :               Objective :            Review of Systems   A 12 point comprehensive review of systems was negative except as noted.        Physical Exam    HEENT : no distended veins, no lymphadenopathy, thyroid is normal  LUNGS : b/l air entry present, no significant crackles/wheezing.  ABDOMEN : soft, non-tender,  non-distended, BS present.  HEART :  Regular rate & rhythm, S1 & S2 normal, no murmur, clicks/rubs, no ankle edema  NEURO : conscious, oriented, responds to commands, no obvious focal deficit.  MUSCULOSKELETAL / EXTREMITIES :  no calf tenderness.  SKIN : no rashes  BACK : wnl        Pertinent Labs   Lab Results: personally reviewed.   Lab Results   Component Value Date     04/09/2017    K 4.6 04/09/2017     04/09/2017    CO2 20 (L) 04/09/2017    BUN 59 (H) 04/09/2017    CREATININE 1.99 (H) 04/09/2017    CALCIUM 7.9 (L) 04/09/2017           Pertinent Radiology   Radiology Results: Personally reviewed image/s  EKG Results: not done              DR. ADELAIDE ISBELL  [facility]

## 2021-06-09 NOTE — ANESTHESIA PREPROCEDURE EVALUATION
Anesthesia Evaluation      Patient summary reviewed   No history of anesthetic complications     Airway   Mallampati: III  Neck ROM: limited   Pulmonary - normal exam   (+) a smoker (30 pack year, quit 1991)                         Cardiovascular   (+) hypertension, past MI (NSTEMI), CAD, ,     (-) murmur  ECG reviewed  Rhythm: regular  Rate: normal,    no murmur   ROS comment: Echo 3.28.17    No previous study for comparison.    Technically challenging study. Definity contrast utilized    Left ventricular systolic function appears lower limits of normal estimated visual ejection fraction of approximately 55%.    The following segments are akinetic: basal inferior and mid inferoseptal. The following segments are hypokinetic: basal inferolateral and mid inferior    Left Atrium: Left atrial volume is mildly increased    No observed significant valve abnormality    Pericardium: Trace pericardial effusion     Neuro/Psych      Comments: 1. Mild atheromatous plaque in the carotid arteries.  2. No significant stenosis in the carotid arteries.  3. Abnormal right vertebral artery waveform, with the appearance suggestive of complete subclavian steal.    Endo/Other    (+) diabetes mellitus type 2, obesity (BMI 37.18),      Comments: Surgery was postponed due to thrombocytopenia..    Hematologist has cleared pt for surgery  No definite etiology for thrombocytopenia;    Could be due to myelodysplasia or mild immune mediated thrombocytopenia    GI/Hepatic/Renal - negative ROS      Other findings: 03/28/17 1819 US Carotid Bilateral View Image  Impression:   CONCLUSION:  1. Mild atheromatous plaque in the carotid arteries.  2. No significant stenosis in the carotid arteries.  3. Abnormal right vertebral artery waveform, with the appearance suggestive of complete subclavian steal.     3/28/17 Coronary angio    Conclusion     NSTEMI in diabetic with previous PCI of the circumflex for a lateral STEMI > 20 years ago.    Severe, focal  LAD and circumflex disease with a calcified chronic occlusion of the proximal and mid RCA with good collateral flow to the distal RCA system        Echo   3/28/17  Summary       No previous study for comparison.    Technically challenging study. Definity contrast utilized    Left ventricular systolic function appears lower limits of normal estimated visual ejection fraction of approximately 55%.    The following segments are akinetic: basal inferior and mid inferoseptal. The following segments are hypokinetic: basal inferolateral and mid inferior    Left Atrium: Left atrial volume is mildly increased    No observed significant valve abnormality    Pericardium: Trace pericardial effusion                  Lab Results       Component                Value               Date                       WBC                      6.9                 03/31/2017                 HGB                      11.5 (L)            03/31/2017                 HCT                      35.7 (L)            03/31/2017                 MCV                      91                  03/31/2017                 PLT                      91 (L)              03/31/2017             HIT panel pending  Lab Results       Component                Value               Date                       INR                      0.96                03/29/2017                 INR                      0.94                03/28/2017             Obese      Dental    (+) upper dentures and lower dentures    Comment: Lower partial, discussed the risk of dental trauma                       Anesthesia Plan  Planned anesthetic: general endotracheal  RAYMOND for monitoring  ASA 3     Anesthetic plan and risks discussed with: patient  Anesthesia plan special considerations: video-assisted, increased risk of difficult airway, antiemetics, CVP line, arterial catheterization, pulmonary artery catheterization, IV therapy two IVs, dexmedetomidine  Post-op plan: routine recovery

## 2021-06-09 NOTE — PROGRESS NOTES
"[place] Daily Progress Note      Date of Service: 3/29/2017     Assessment and plan    NSTEMI  ;cardiology on board  ;s/p cardiac cath : Severe multivessel disease, including  of RCA  ;CV surgery consulted   ;planned for CABG     Severe CAD  ; Patient on high-dose Lipitor  ; Continue aspirin atenolol losartan  ;Check lipid panel : Triglyceride of 419  hemoglobin A1c 6      Mild elevated BNP  ; Does not appear in heart failure at present  ; Lungs clear, saturating well on room air  ; Check echo: EF of 55%  ; Discontinue IV fluid     Essential hypertension  ; Continue home medication  ; Blood pressure appears controlled at present     History of dyslipidemia  ; High-dose Lipitor      History of diabetes mellitus  ; Hold metformin  ; Check hemoglobin A1c 6  ; Insulin scale coverage     History of sleep apnea  : CPAP at night     Body mass index is 37.17 kg/(m^2).        DVT Prophylaxis: IV heparin drip       Subjective:   Patient sitting up in bed in no distress  Denies any chest pain or difficulty breathing    Brief History: 76 y.o. old male with history of hypertension, dyslipidemia, diabetes mellitus, history of CAD, had presented emergency with complaint of chest pain, found to have an NSTEMI with elevated troponin, underwent coronary angiogram which showed multiple vessel disease and is planned for CABG.    Chart reviewed, events noted. Pt seen and examined.     Objective     Vital signs in last 24 hours:  Temp:  [97.6  F (36.4  C)-98.6  F (37  C)] 97.7  F (36.5  C)  Heart Rate:  [59-73] 66  Resp:  [16-18] 16  BP: (123-151)/(63-78) 151/69  FiO2 (%):  [21 %] 21 %  Weight:   (!) 240 lb 9.6 oz (109.1 kg)  Weight change:   Body mass index is 36.58 kg/(m^2).    Intake/Output last 3 shifts:     Intake/Output this shift:         Physical Exam:  /69 (Patient Position: Sitting)  Pulse 66  Temp 97.7  F (36.5  C) (Oral)   Resp 16  Ht 5' 8\" (1.727 m)  Wt (!) 240 lb 9.6 oz (109.1 kg)  SpO2 99%  BMI 36.58 " kg/m2    FiO2 (%): 21 % O2 Device: None (Room air) O2 Flow Rate (L/min): 2 L/min    General Appearance:    Alert, no apparent distress.    HEENT:    Normocephalic. Pupils equal and reactive. No scleral   Icterus. Moist oral mucosa    Lungs:     Clear to auscultation bilaterally, respirations unlabored   Heart::    Regular rate and rhythm, S1 and S2 normal, no murmur, rub   or gallop. No peripheral edema.   Abdomen:   Soft, Non tender. Non distended. Bowel sounds present.    Extremity :   No deformity   Pulses:   2+ and symmetric all extremities   CNS:   Alert and oriented, no facial asymmetry  No focal neurologic deficits         Imaging:  personally reviewed.      Scheduled Meds:    amLODIPine  5 mg Oral DAILY     aspirin  81 mg Oral DAILY     atenolol  25 mg Oral DAILY     atorvastatin  80 mg Oral DAILY     latanoprost  1 drop Both Eyes QHS     losartan-hydrochlorothiazide (HYZAAR) 100-12.5 mg combo dose (INP)   Oral DAILY     niacin  250 mg Oral BID with meals     tamsulosin  0.4 mg Oral Daily after supper     Continuous Infusions:    nacl 0.9% Stopped (03/28/17 1708)     PRN Meds:.acetaminophen, dextrose 50 % (D50W), glucagon (human recombinant), morphine  injection, naloxone **OR** naloxone, nitroglycerin, oxyCODONE    Lab Results:  personally reviewed.   not applicable  Recent Results (from the past 24 hour(s))   POCT Glucose    Collection Time: 03/28/17  5:03 PM   Result Value Ref Range    Glucose,  mg/dL   Anti-Xa Heparin Level    Collection Time: 03/28/17  5:05 PM   Result Value Ref Range    Anti-Xa Heparin Assay <0.10 (L) 0.30 - 0.70 IU/mL   Troponin I    Collection Time: 03/28/17  5:05 PM   Result Value Ref Range    Troponin I 6.20 (HH) 0.00 - 0.29 ng/mL   Troponin I    Collection Time: 03/28/17  9:23 PM   Result Value Ref Range    Troponin I 6.19 (HH) 0.00 - 0.29 ng/mL   POCT Glucose    Collection Time: 03/28/17 11:47 PM   Result Value Ref Range    Glucose, POC 88 mg/dL   POCT Glucose     Collection Time: 03/29/17  6:54 AM   Result Value Ref Range    Glucose,  mg/dL   Prealbumin    Collection Time: 03/29/17  8:51 AM   Result Value Ref Range    Prealbumin 27.1 19.0 - 38.0 mg/dL   Protime-INR    Collection Time: 03/29/17  8:51 AM   Result Value Ref Range    INR 0.96 0.90 - 1.10   Magnesium    Collection Time: 03/29/17  8:51 AM   Result Value Ref Range    Magnesium 1.7 (L) 1.8 - 2.6 mg/dL   POCT Glucose    Collection Time: 03/29/17 12:07 PM   Result Value Ref Range    Glucose, POC 96 mg/dL   Urinalysis-UC if Indicated    Collection Time: 03/29/17  1:10 PM   Result Value Ref Range    Color, UA Straw Colorless, Yellow, Straw, Light Yellow    Clarity, UA Clear Clear    Glucose, UA Negative Negative    Bilirubin, UA Negative Negative    Ketones, UA Negative Negative    Specific Gravity, UA 1.009 1.001 - 1.030    Blood, UA Negative Negative    pH, UA 6.0 4.5 - 8.0    Protein,  mg/dL (!) Negative mg/dL    Urobilinogen, UA <2.0 E.U./dL <2.0 E.U./dL, 2.0 E.U./dL    Nitrite, UA Negative Negative    Leukocytes, UA Negative Negative    Bacteria, UA Moderate (!) None Seen hpf    RBC, UA 0-2 None Seen, 0-2 hpf    WBC, UA 0-5 None Seen, 0-5 hpf    Squam Epithel, UA 0-5 None Seen, 0-5 lpf       Results from last 7 days  Lab Units 03/29/17  1207 03/29/17  0654 03/28/17  2347 03/28/17  1703 03/28/17  1211   LN-POC GLUCOSE FINGERSTICK- HE mg/dL 96 116 88 113 95           Advance Care Planning:   Barriers to discharge: Awaiting CABG  Anticipated discharge day: 2-3 days  Disposition: Likely home      Yarelis Sears MD  [facility]  Hospitalist

## 2021-06-09 NOTE — PROGRESS NOTES
Pt unable to void all day. Bladder scanned for >950. Spoke to cv surgery, Rashaad Ryan. Torres placed as per order. Pt tolerated placement well. 1200 cc of urine drained.    Sol Velarde RN

## 2021-06-09 NOTE — PROGRESS NOTES
"Progress Note    Assessment/Plan  S/P CABG X 3 POD # 3  Awake and alert, OOB to chair  AVSS, A-fib with RVR, Amiodarone 150 mg IV bolus, then drip per protocol  Denies chest pain and or overt shortness of breath. Complaint of neck stiffness, will start Flexeril  Recent NSTEMI, stable postop  DM stable on SSI  Thrombocytopenia, requiring both pre-op and postop platelet transfusions, Plt 147  Acute blood loss anemia, Hgb 7.9, will continue to monitor closely as patient is hemodynamically stable  Incisions CDI, lung sounds diminished in the base  Chest tube drainage 30/380, serous  Chest tube removed, patient tolerated well  Abdomen obese, soft and non tender  Wt 109.8 kg, pre-op wt 110.9 kg  No LE edema  Will continue gentle diuresis  Continue pulmonary toilet and mobilization  Home with family at discharge  Principal Problem:    NSTEMI (non-ST elevated myocardial infarction)  Active Problems:    Hypertension    Dyslipidemia    CAD (coronary artery disease)    Type 2 diabetes mellitus    Thrombocytopenia    Coronary artery disease due to lipid rich plaque    Essential hypertension    S/P CABG x 3      Subjective  Denies incisional chest pain, complaint of stiff neck  Objective    Vital signs in last 24 hours  Temp:  [97.6  F (36.4  C)-98.6  F (37  C)] 97.6  F (36.4  C)  Heart Rate:  [60-79] 79  Resp:  [17-20] 18  BP: (106-139)/(53-75) 119/75  Weight:   (!) 242 lb 1 oz (109.8 kg)    Intake/Output last 3 shifts  I/O last 3 completed shifts:  In: 480 [P.O.:480]  Out: 1180 [Urine:800; Chest Tube:380]  Intake/Output this shift:  I/O this shift:  In: 240 [P.O.:240]  Out: -     Review of Systems   A 12 point comprehensive review of systems was negative except as noted.    Physical Exam  /75 (Patient Position: Sitting)  Pulse 79  Temp 97.6  F (36.4  C) (Oral)   Resp 18  Ht 5' 8\" (1.727 m)  Wt (!) 242 lb 1 oz (109.8 kg) Comment: nurse aware  SpO2 93%  BMI 36.81 kg/m2  A-fib with RVR, lung sounds diminished in the " base  Abdomen obese  No LE edema    Pertinent Labs   Lab Results: personally reviewed.   Lab Results   Component Value Date     04/08/2017    K 4.6 04/08/2017     04/08/2017    CO2 20 (L) 04/08/2017    BUN 43 (H) 04/08/2017    CREATININE 1.87 (H) 04/08/2017    CALCIUM 8.0 (L) 04/08/2017     Lab Results   Component Value Date    WBC 9.5 04/08/2017    HGB 7.9 (L) 04/08/2017    HCT 25.8 (L) 04/08/2017    MCV 96 04/08/2017     04/08/2017       Pertinent Radiology   Radiology Results: Personally reviewed image/s, Personally reviewed impression/s and Mild basilar atelectasis  EKG Results: personally reviewed.  and A-fib with THALIAR    Vivek Ryan

## 2021-06-09 NOTE — PROGRESS NOTES
[facility] Progress Note      Date of Service: 3/31/2017     Assessment and plan    NSTEMI  ; Off heparin drip  ;cardiology on board  ;s/p cardiac cath : Severe multivessel disease, including  of RCA  ;CV surgery consulted   ;planned for CABG : was canceled secondary to thrombocytopenia     Thrombocytopenia, INR normal   ;85 to 77 to 91  ;no of bleeding, no rash , no easy bruising   ;was recently on heparin drip: check for HIT   ;platelet was 72 in care everywhere on 3/28/17 prior to heparin drip   ;hemotology has been consulted   ;taken off aspirin     Severe CAD  ; Patient on high-dose Lipitor  ; Continue atenolol losartan  ;Check lipid panel : Triglyceride of 419  hemoglobin A1c 6    Mild elevated BNP  ; Does not appear in heart failure at present  ; Lungs clear, saturating well on room air  ; Check echo: EF of 55%  ; Discontinue IV fluid    Essential hypertension  ; Continue home medication  ; Blood pressure appears controlled at present     History of dyslipidemia  ; High-dose Lipitor      History of diabetes mellitus  ; Hold metformin  ; Check hemoglobin A1c 6  ; Insulin scale coverage     History of sleep apnea  : CPAP at night     Body mass index is 37.17 kg/(m^2).        DVT Prophylaxis: SCDs    Subjective:   Feel fine  frustrated surgery is cancelled      Brief History: 76 y.o. old male with history of hypertension, dyslipidemia, diabetes mellitus, history of CAD, had presented emergency with complaint of chest pain, found to have an NSTEMI with elevated troponin, underwent coronary angiogram which showed multiple vessel disease and is planned for CABG.    Chart reviewed, events noted. Pt seen and examined.     Objective     Vital signs in last 24 hours:  Temp:  [97.7  F (36.5  C)-98.5  F (36.9  C)] 97.7  F (36.5  C)  Heart Rate:  [54-69] 58  Resp:  [16-20] 16  BP: (121-174)/(66-81) 138/70  Weight:   (!) 232 lb 4.8 oz (105.4 kg)  Weight change: -8 lb 11.2 oz (-3.946 kg)  Body mass index is 35.32  "kg/(m^2).    Intake/Output last 3 shifts:  I/O last 3 completed shifts:  In: 1235 [P.O.:960; I.V.:25; Blood:250]  Out: 0   Intake/Output this shift:         Physical Exam:  /70 (Patient Position: Sitting)  Pulse (!) 58  Temp 97.7  F (36.5  C) (Oral)   Resp 16  Ht 5' 8\" (1.727 m)  Wt (!) 232 lb 4.8 oz (105.4 kg)  SpO2 97%  BMI 35.32 kg/m2    FiO2 (%): 21 % O2 Device: None (Room air) O2 Flow Rate (L/min): 2 L/min    General Appearance:    Alert, no apparent distress.    HEENT:    Normocephalic. Pupils equal and reactive. No scleral   Icterus.    Lungs:     Clear to auscultation bilaterally, respirations unlabored   Heart::    Regular rate and rhythm, S1 and S2 normal, no murmur, rub   or gallop.    peripheral edema.   Abdomen:   Soft, Non tender. Non distended. Bowel sounds present.    Extremity :   No deformity   Pulses:   2+ and symmetric all extremities   CNS:   Alert and oriented, no facial asymmetry  No focal neurologic deficits         Imaging:  personally reviewed.      Scheduled Meds:    amLODIPine  5 mg Oral DAILY     atenolol  25 mg Oral DAILY     atorvastatin  80 mg Oral DAILY     latanoprost  1 drop Both Eyes QHS     losartan-hydrochlorothiazide (HYZAAR) 100-12.5 mg combo dose (INP)   Oral DAILY     mupirocin  1 application Nasal BID     sodium chloride  3 mL Intravenous Line Care     tamsulosin  0.4 mg Oral Daily after supper     Continuous Infusions:    nacl 0.9% 25 mL/hr (03/31/17 0600)     PRN Meds:.acetaminophen, dextrose 50 % (D50W), glucagon (human recombinant), lidocaine, morphine  injection, naloxone **OR** naloxone, nitroglycerin, oxyCODONE    Lab Results:  personally reviewed.   not applicable  Recent Results (from the past 24 hour(s))   POCT Glucose    Collection Time: 03/30/17  5:51 PM   Result Value Ref Range    Glucose, POC 87 mg/dL   Platelet Product Information    Collection Time: 03/31/17 12:05 AM   Result Value Ref Range    Unit Type A Pos     Blood Expiration Date " [id number]     Unit Number [id number]     Status Transfused     Component Platelets     PRODUCT CODE W2225V07     Issue Date and Time [id number]     Blood Type 6200     CODING SYSTEM [id number]    Platelet Product Information    Collection Time: 03/31/17 12:05 AM   Result Value Ref Range    Unit Type O Pos     Blood Expiration Date [id number]     Unit Number [id number]     Status Transfused     Component Platelets     PRODUCT CODE O7081G25     Issue Date and Time [id number]     Blood Type 5100     CODING SYSTEM [id number]    HM2(CBC w/o Differential)    Collection Time: 03/31/17  4:03 AM   Result Value Ref Range    WBC 6.9 4.0 - 11.0 thou/uL    RBC 3.91 (L) 4.40 - 6.20 mill/uL    Hemoglobin 11.5 (L) 14.0 - 18.0 g/dL    Hematocrit 35.7 (L) 40.0 - 54.0 %    MCV 91 80 - 100 fL    MCH 29.4 27.0 - 34.0 pg    MCHC 32.2 32.0 - 36.0 g/dL    RDW 13.8 11.0 - 14.5 %    Platelets 91 (L) 140 - 440 thou/uL    MPV 13.8 (H) 8.5 - 12.5 fL   Magnesium    Collection Time: 03/31/17  4:03 AM   Result Value Ref Range    Magnesium 2.3 1.8 - 2.6 mg/dL   Potassium    Collection Time: 03/31/17  4:03 AM   Result Value Ref Range    Potassium 4.3 3.5 - 5.0 mmol/L   Comprehensive Metabolic Panel    Collection Time: 03/31/17  8:28 AM   Result Value Ref Range    Sodium 139 136 - 145 mmol/L    Potassium 4.4 3.5 - 5.0 mmol/L    Chloride 105 98 - 107 mmol/L    CO2 23 22 - 31 mmol/L    Anion Gap, Calculation 11 5 - 18 mmol/L    Glucose 119 70 - 125 mg/dL    BUN 21 8 - 28 mg/dL    Creatinine 1.29 0.70 - 1.30 mg/dL    GFR MDRD Af Amer >60 >60 mL/min/1.73m2    GFR MDRD Non Af Amer 54 (L) >60 mL/min/1.73m2    Bilirubin, Total 0.4 0.0 - 1.0 mg/dL    Calcium 9.5 8.5 - 10.5 mg/dL    Protein, Total 7.6 6.0 - 8.0 g/dL    Albumin 3.5 3.5 - 5.0 g/dL    Alkaline Phosphatase 80 45 - 120 U/L    AST 33 0 - 40 U/L    ALT 28 0 - 45 U/L   POCT Glucose    Collection Time: 03/31/17 11:53 AM   Result Value Ref Range    Glucose,  mg/dL       Results  from last 7 days  Lab Units 03/31/17  1153 03/30/17  1751 03/30/17  1156 03/30/17  0747 03/29/17  1703 03/29/17  1207 03/29/17  0654 03/28/17  2347 03/28/17  1703 03/28/17  1211   LN-POC GLUCOSE FINGERSTICK- HE mg/dL 122 87 121 124 98 96 116 88 113 95           Advance Care Planning:   Barriers to discharge: Awaiting CABG  Anticipated discharge day: 2-3 days  Disposition: Likely home      Yarelis Sears MD  [facility], [place]  Hospitalist

## 2021-06-09 NOTE — PROGRESS NOTES
"Progress Note    Assessment/Plan  S/P CABG X 3 POD # 1  Awake and alert, OOB to chair  AVSS, NSR per monitor  Denies chest pain and no overt shortness of breath  Mild acute postop respiratory insufficiency requiring supplemental oxygen 2 liters nasal cannula  Adequate inspiratory effort, IS ~ 750  Acute blood loss anemia, Hgb 7.8, patient is hemodynamically stable  Will start Fe and Vitamin C therapy  Thrombocytopenia pre-op requiring platelet transfusion, today's  Plt 126  DM, stable  Lantus 11 units X 1 dose then SSI  Incisions CDI, lung sounds diminished in the bases  Chest tube drainage 260/710, still bloody  Will leave chest tube in due to excessive drainage  Abdomen obese, soft with bowel sounds present in all four quadrants  UO adequate, no BM yet but passing flatus per rectum  Wt 99.2 kg, pre-op 104.7 kg  No LE edema  Will hold off diuresis unless UO trials of  All lines out  Continue pulmonary toilet  Transfer to tele today      Subjective  Denies chest pain and or shortness of breath  Objective  Awake and alert, OOB to chair, no apparent distress  Vital signs in last 24 hours  Temp:  [96.1  F (35.6  C)-99.5  F (37.5  C)] 99.3  F (37.4  C)  Heart Rate:  [64-82] 72  Resp:  [16-23] 20  BP: ()/(42-59) 107/58  Arterial Line BP: ()/(44-60) 116/48  FiO2 (%):  [24 %-60 %] 24 %  Weight:   218 lb 9.6 oz (99.2 kg)  Pre-op wt 104.7 kg  Intake/Output last 3 shifts  I/O last 3 completed shifts:  In: 7640.1 [I.V.:5101.1; Blood:1339; IV Piggyback:1200]  Out: 3865 [Urine:2555; Blood:600; Chest Tube:710]  Intake/Output this shift:       Review of Systems   A 12 point comprehensive review of systems was negative except as noted.    Physical Exam  /58  Pulse 72  Temp 99.3  F (37.4  C) (Core)   Resp 20  Ht 5' 8\" (1.727 m)  Wt 218 lb 9.6 oz (99.2 kg)  SpO2 95%  BMI 33.24 kg/m2  HRR, incisions CDI, lung sounds diminished in the bases  Abdomen soft and mildly tender on the left upper quadrant  No LE " edema    Pertinent Labs   Lab Results: personally reviewed.   Lab Results   Component Value Date     04/06/2017    K 4.5 04/06/2017     (H) 04/06/2017    CO2 21 (L) 04/06/2017    BUN 24 04/06/2017    CREATININE 1.53 (H) 04/06/2017    CALCIUM 7.5 (L) 04/06/2017     Lab Results   Component Value Date    WBC 9.2 04/06/2017    HGB 7.8 (L) 04/06/2017    HCT 25.4 (L) 04/06/2017    MCV 95 04/06/2017     (L) 04/06/2017       Pertinent Radiology   Radiology Results: Personally reviewed image/s, Personally reviewed impression/s and Bilateral pulmonary congestion with left pleural effusion  EKG Results: personally reviewed.  and NSR per monitor    Vivek Ryan

## 2021-06-09 NOTE — PROGRESS NOTES
Pt arrived from ICU. FRANCO Torres in place. No weight recorded from ICU today. Will weigh and document. Will continue to monitor and follow plan of care.     Sol Velarde RN

## 2021-06-09 NOTE — PROGRESS NOTES
Spiritual Care Follow up visit    Spiritual Assessment:   made a post surgery follow up visit with patient this morning. Patient sitting up in his Chair when  arrives; no family present. Patient seems tired as he talks about the long road he has been on, his disappointment that his heart surgery had to be rescheduled, feeling grateful to have made it through the surgery, and trusting because he has made it this far God will continue to help him make it the rest of the way. Patient does appear to be doing well in his recovery and has the support he needs;  notes no concerns.     Care Provided: Patient denies needs; support offered.     Plan of Care:   will continue to follow as a member of patient's care team.    CHRISTIANO Triplett, BCC

## 2021-06-09 NOTE — PROGRESS NOTES
"Cardiology Progress Note    Assessment:    Non-ST segment elevation myocardial infarction, hemodynamically stable, pain-free   Coronary artery disease with remote history of circumflex stenting, three-vessel involvement including total occlusion of RCA  Hypertension, good control  Diabetes mellitus    Plan:  Coronary artery bypass graft surgery later this week  Continue current cardiac medications    Subjective:   Denies recurrent chest pain or shortness of breath    Objective:   /63 (Patient Position: Sitting)  Pulse 67  Temp 98.4  F (36.9  C) (Oral)   Resp 16  Ht 5' 8\" (1.727 m)  Wt (!) 240 lb 9.6 oz (109.1 kg)  SpO2 96%  BMI 36.58 kg/m2    Intake/Output Summary (Last 24 hours) at 03/29/17 1107  Last data filed at 03/28/17 1211   Gross per 24 hour   Intake              185 ml   Output                0 ml   Net              185 ml         Physical Exam:  GENERAL: no distress  NECK: No JVD  LUNGS: Clear to auscultation.  CARDIAC: regular  rhythm, S1 & S2 normal.  No heaves, thrills, gallops or murmurs.  ABDOMEN: flat, negative hepatosplenomegaly, soft and non-tender.  EXTREMITIES: No evidence of cyanosis, clubbing or edema.    Current Facility-Administered Medications   Medication Dose Route Frequency Provider Last Rate Last Dose     acetaminophen tablet 500-1,000 mg (TYLENOL)  500-1,000 mg Oral Q4H PRN Sophy Quick MD         amLODIPine tablet 5 mg (NORVASC)  5 mg Oral DAILY TARAN Steel   5 mg at 03/29/17 0855     aspirin chewable tablet 81 mg  81 mg Oral DAILY Britta Stacy CNP   81 mg at 03/29/17 0900     atenolol tablet 25 mg (TENORMIN)  25 mg Oral DAILY TARAN Steel   25 mg at 03/29/17 0854     atorvastatin tablet 80 mg (LIPITOR)  80 mg Oral DAILY Sophy Quick MD   80 mg at 03/29/17 0854     dextrose 50 % (D50W) syringe 20-50 mL  20-50 mL Intravenous PRN TARAN Steel         glucagon (human recombinant) injection 1 mg  1 mg Subcutaneous PRN Renu " Yarelis, MBBS         latanoprost 0.005 % ophthalmic solution 1 drop (XALATAN)  1 drop Both Eyes QHS Tsewang Yarelis, MBBS   1 drop at 03/28/17 2012     losartan-hydrochlorothiazide (HYZAAR) 100-12.5 mg combo dose (INP)   Oral DAILY Tsewamario Santoyo, MBBS         morphine injection 1-2 mg  1-2 mg Intravenous Q3H PRN Sophy Quick MD         naloxone injection 0.2-0.4 mg (NARCAN)  0.2-0.4 mg Intravenous PRN Rudi Arzate (Ulisses) MD Robert        Or     naloxone injection 0.2-0.4 mg (NARCAN)  0.2-0.4 mg Intramuscular PRN Rudi Arzate (Ulisses) MD Robert         niacin tablet 250 mg  250 mg Oral BID with meals Tsewamario Santoyo MBBS   250 mg at 03/29/17 0900     nitroglycerin SL tablet 0.4 mg (NITROSTAT)  0.4 mg Sublingual Q5 Min PRN TseIGNACIO BlountBS         oxyCODONE immediate release tablet 5-10 mg (ROXICODONE)  5-10 mg Oral Q4H PRN Sophy Quick MD         sodium chloride 0.9%  50 mL/hr Intravenous Continuous Renu Santoyo MBBS   Stopped at 03/28/17 1708     tamsulosin 24 hr capsule 0.4 mg (FLOMAX)  0.4 mg Oral Daily after supper Tsepat Santoyo, MBBS   0.4 mg at 03/28/17 1706       Cardiographics:    Telemetry: Normal sinus rhythm    Echocardiogram:    Left ventricular systolic function appears lower limits of normal estimated visual ejection fraction of approximately 55%.    The following segments are akinetic: basal inferior and mid inferoseptal. The following segments are hypokinetic: basal inferolateral and mid inferior    Left Atrium: Left atrial volume is mildly increased    No observed significant valve abnormality    Pericardium: Trace pericardial effusion    Coronary angio:    Severe, focal LAD and circumflex disease with a calcified chronic occlusion of the proximal and mid RCA with good collateral flow to the distal RCA system  Lab Results:     Results from last 7 days  Lab Units 03/28/17  0944   LN-SODIUM mmol/L 138   LN-POTASSIUM mmol/L 4.6   LN-CHLORIDE mmol/L 109*   LN-CO2  mmol/L 20*   LN-BLOOD UREA NITROGEN mg/dL 29*   LN-CREATININE mg/dL 1.25   LN-CALCIUM mg/dL 8.6       Results from last 7 days  Lab Units 03/28/17  0944   LN-WHITE BLOOD CELL COUNT thou/uL 8.3   LN-HEMOGLOBIN g/dL 12.1*   LN-HEMATOCRIT % 38.0*   LN-PLATELET COUNT thou/uL 85*     No results found for: BNP  Lab Results   Component Value Date    INR 0.96 03/29/2017    INR 0.94 03/28/2017     Lab Results   Component Value Date    TROPONINI 6.19 () 03/28/2017    TROPONINI 6.20 () 03/28/2017    TROPONINI 2.11 () 03/28/2017       Rudi ([facility])  MD Robert

## 2021-06-09 NOTE — ANESTHESIA PROCEDURE NOTES
RAYMOND    Patient location during procedure: OR  Start time: 4/5/2017 7:42 AM  Staffing:  Performing  Anesthesiologist: SANDRA MONTE  RAYMOND:  Type/Reason: Monitoring RAYMOND  Technique: blind insertion  Difficulty: easy  Anesthesia Monitoring: see additional note

## 2021-06-09 NOTE — PROGRESS NOTES
Progress Note    Brief summary:   76 y.o. old male with history of hypertension, dyslipidemia, diabetes mellitus, history of CAD, had presented emergency with complaint of chest pain, found to have an NSTEMI with elevated troponin, underwent coronary angiogram which showed multiple vessel disease and is planned for CABG.  CABG postponed secondary to thrombocytopenia. Hematology thinks its probably myelodysplasia or mild ITP.    Assessment/Plan  Principal Problem:    NSTEMI (non-ST elevated myocardial infarction)  Active Problems:    Hypertension    Dyslipidemia    CAD (coronary artery disease)    Type 2 diabetes mellitus    Thrombocytopenia    Coronary artery disease due to lipid rich plaque    Essential hypertension      #NSTEMI (non-ST elevated myocardial infarction); CAD (coronary artery disease);s/p cardiac cath : Severe multivessel disease, including  of RCA; plan is for CABG Wednesday, 4/5/17. On atenolol, lipitor, losartan    #Thrombocytopenia: cleared for CABG by hematology. ASA on hold. Heme recommends a unit of platelets just prior to procedure if needed and as needed afterwards.     #Essential Hypertension: controlled on Norvasc, Atenolol, Hyzaar    #Dyslipidemia: Continue Lipitor    #Type 2 diabetes mellitus: metformin on hold. Continue qid BG checks. Sugars well controlled without any treatment. A1c 3/28 was 6.0    #SLOAN on home CPAP at night    Subjective  Patient seen and examined  No chest pain or SOB    Objective    Vital signs in last 24 hours  Temp:  [97.4  F (36.3  C)-98.6  F (37  C)] 97.4  F (36.3  C)  Heart Rate:  [53-60] 60  Resp:  [17-20] 17  BP: (123-152)/(62-70) 123/68  Weight:   (!) 230 lb 4.8 oz (104.5 kg)    Intake/Output last 3 shifts  I/O last 3 completed shifts:  In: 600 [P.O.:600]  Out: -   Intake/Output this shift:       Physical Exam  General appearance: alert, appears stated age and cooperative  Eyes: conjunctivae/corneas clear. PERRL, EOM's intact. Fundi benign.  Lungs: clear  to auscultation bilaterally  Heart: regular rate and rhythm, S1, S2 normal, no murmur, click, rub or gallop  Abdomen: soft, non-tender; bowel sounds normal; no masses,  no organomegaly  Extremities: extremities normal, atraumatic, no cyanosis or edema  Neurologic: Grossly normal    Meds    amLODIPine  5 mg Oral DAILY     atenolol  25 mg Oral DAILY     atorvastatin  80 mg Oral DAILY     [START ON 4/5/2017] electrolyte-A  1,000 mL Perfusion Q15 Min     latanoprost  1 drop Both Eyes QHS     losartan-hydrochlorothiazide (HYZAAR) 100-12.5 mg combo dose (INP)   Oral DAILY     mupirocin  1 application Nasal BID     sodium chloride  3 mL Intravenous Line Care     sodium chloride  3 mL Intravenous Line Care     tamsulosin  0.4 mg Oral Daily after supper       Pertinent Labs   Lab Results: personally reviewed.   not applicable      Results from last 7 days  Lab Units 04/04/17  1039 04/04/17  0712 04/03/17  0547  03/31/17  0828  03/30/17  0617   LN-SODIUM mmol/L 139  --   --   --  139  --  137   LN-POTASSIUM mmol/L 4.1 4.2 4.2  < > 4.4  < > 4.5   LN-CHLORIDE mmol/L 104  --   --   --  105  --  105   LN-CO2 mmol/L 26  --   --   --  23  --  22   LN-BLOOD UREA NITROGEN mg/dL 25  --   --   --  21  --  23   LN-CREATININE mg/dL 1.38*  --   --   --  1.29  --  1.23   LN-CALCIUM mg/dL 9.6  --   --   --  9.5  --  9.3   < > = values in this interval not displayed.        Results from last 7 days  Lab Units 04/03/17  0547 04/01/17  0704 03/31/17  1922   LN-WHITE BLOOD CELL COUNT thou/uL 7.4 7.1 7.0   LN-HEMOGLOBIN g/dL 11.9* 12.0* 12.9*   LN-HEMATOCRIT % 37.6* 37.6* 39.3*   LN-PLATELET COUNT thou/uL 106* 97* 103*         Pertinent Radiology   Radiology Results: Personally reviewed impression/s    Xr Chest Pa And Lateral    Result Date: 3/28/2017  XR CHEST PA AND LATERAL 3/28/2017 4:06 PM INDICATION: preop CABG COMPARISON: 3/28/2017 at 0337 hours FINDINGS: No pleural fluid or pneumothorax. There are no focal airspace opacities.  Heterogeneous likely calcified opacities in the midlungs are nonspecific though could represent nodules, pleural calcifications, or musculoskeletal opacities. Recommend comparison with any prior studies. If none are available, nonemergent chest CT is recommended. There is no significant edema. The cardiomediastinal silhouette is at the upper limits of normal size.    Us Carotid Bilateral    Result Date: 3/28/2017  US CAROTID BILATERAL 3/28/2017 6:19 PM INDICATION: CAD preop CABG TECHNIQUE: Duplex exam performed utilizing 2D gray-scale imaging, Doppler interrogation with color-flow and spectral waveform analysis. COMPARISON: None. FINDINGS: RIGHT: There is mild atheromatous plaque. Normal waveforms with no significant stenosis. LEFT: There is mild atheromatous plaque. Normal waveforms with no significant stenosis. There is abnormal waveform involving the right vertebral artery, with the appearance that of complete subclavian steal. The right subclavian artery waveform is normal. The left vertebral arteries and subclavian artery waveforms are normal. VELOCITY CHART: The following velocities were obtained in the RIGHT carotid system. CCA: 99/12 cm/s ICA: 99/25 cm/s ECA: 118/16 cm/s ICA/CCA: PS 0.99 The following velocities were obtained in the LEFT carotid system. CCA: 101/20 cm/s ICA: 90/29 cm/s ECA: 130/17 cm/s ICA/CCA: PS 0.89                            CONCLUSION: 1.  Mild atheromatous plaque in the carotid arteries. 2.  No significant stenosis in the carotid arteries. 3.  Abnormal right vertebral artery waveform, with the appearance suggestive of complete subclavian steal. Evaluation based on velocities and NASCET criteria. NOTE: ABNORMAL REPORT THE DICTATION ABOVE DESCRIBES AN ABNORMALITY FOR WHICH FOLLOW-UP IS NEEDED.     Us Abdomen Limited    Result Date: 3/31/2017  US ABDOMEN LIMITED 3/31/2017 6:09 PM INDICATION: thrombocytopenia, to assess spleen size TECHNIQUE: Routine. COMPARISON: None. FINDINGS: The spleen  is normal size measuring 9.8 x 4.8 x 3.6 cm. No splenic lesion seen.       EKG Results: personally reviewed.     DVT prophylaxis: ambulation  Diet: cardiac  Code Status: full    Advanced Care Planning:  Discharge Planning discussed with patient  Anticipated LOS: TBD  Barrier to discharge:CABG tomorrow  Disposition:TBD  Discussed care with patient for >35 minutes with greater than 50% of time spent in counseling and coordination of care.      Delmy Diaz MD  Hospitalist  [phone]

## 2021-06-09 NOTE — CONSULTS
Cardiothoracic Surgery Consult    Date of Service: 3/28/2017    REFERRING CARDIOLOGIST: Dr. Ulisses Jones    REASON FOR CONSULTATION: Mr. Gaona is a 76 year-old man with a recent non-ST elevation MI and severe multivessel coronary artery disease.     HISTORY OF PRESENT ILLNESS: Mr. Gaona is a 76 year-old man who presented with chest pain this morning and was found to have an NSTEMI. He has CAD risk factors including hypertension, dyslipidemia, and type 2 diabetes mellitus. He underwent coronary angiography and this demonstrate severe multivessel coronary artery disease with a  of the RCA. He has no heart failure symptoms and he is not chest pain free. EKG showed ST segment depression by report which eventually improved with treatment.    PAST MEDICAL HISTORY:   Hypertension  Dyslipidemia  CAD (coronary artery disease)  Type 2 diabetes mellitus    PAST SURGICAL HISTORY: No past surgical history on file.    ALLERGIES:   No Known Allergies       CURRENT MEDICATIONS:   amLODIPine 5 mg Oral DAILY     aspirin 81 mg Oral DAILY     atenolol 25 mg Oral DAILY     atorvastatin 20 mg Oral DAILY     latanoprost 1 drop Both Eyes QHS     losartan-hydrochlorothiazide (HYZAAR) 100-12.5 mg combo dose (INP)    Oral DAILY     niacin 250 mg Oral BID with meals     tamsulosin 0.         FAMILY HISTORY:   Family History   Problem Relation Age of Onset     Diabetes Brother        SOCIAL HISTORY:   Social History     Social History     Marital status:      Spouse name: N/A     Number of children: N/A     Years of education: N/A     Occupational History     Not on file.     Social History Main Topics     Smoking status: Former Smoker     Packs/day: 1.00     Years: 30.00     Types: Cigarettes     Start date: 3/28/1961     Quit date: 3/28/1991     Smokeless tobacco: Not on file     Alcohol use No      Comment: occasional drinks     Drug use: No     Sexual activity: Not on file     Other Topics Concern     Not on file  "    Social History Narrative     No narrative on file       REVIEW OF SYSTEMS:  A complete 10 point review of systems was obtained and is negative other than the above stated complaints    PHYSICAL EXAMINATION:   Vitals: /86  Pulse (!) 58  Temp 97.7  F (36.5  C)  Resp 20  Ht 5' 8\" (1.727 m)  Wt (!) 244 lb 7 oz (110.9 kg)  SpO2 97%  BMI 37.17 kg/m2  GENERAL:  Well developed and well nourished   HEENT: Normocephalic, conjunctiva anicteric and sclera clear   NECK: negative findings: no asymmetry, masses, or scars  CARDIOVASCULAR: Regular rate and rhythm  RESPIRATIONS: no tachypnea, retractions or cyanosis  ABDOMEN: normal findings: soft, non-tenderno masses palpable and soft, non-tender   EXTREMITIES:negative; no deformity or swelling   NEUROLOGIC: intact and symmetric with no focal deficits.   PSYCHIATRIC: alert and oriented x3, pleasant     LABORATORY STUDIES:   Lab Results   Component Value Date    WBC 8.3 03/28/2017    HGB 12.1 (L) 03/28/2017    HCT 38.0 (L) 03/28/2017    MCV 94 03/28/2017    PLT 85 (L) 03/28/2017      Lab Results   Component Value Date    CREATININE 1.25 03/28/2017    BUN 29 (H) 03/28/2017     03/28/2017    K 4.6 03/28/2017     (H) 03/28/2017    CO2 20 (L) 03/28/2017     Lab Results   Component Value Date    HGBA1C 6.0 03/28/2017       CARDIAC CATHETERIZATION: This study was personally reviewed by me  Left Anterior Descending      Prox LAD to Mid LAD lesion, 80% stenosed.      Left Circumflex      Mid Cx lesion, 80% stenosed.      Right Coronary Artery      Prox RCA lesion, 90% stenosed.      Prox RCA to Mid RCA lesion, 100% stenosed. The lesion is severely calcified.      Dist RCA lesion, 50% stenosed.      Inferior Septal    Inf Sept filled by collaterals from 2nd Sept.      Right Posterior Atrioventricular Branch      Post Atrio lesion, 20% stenosed.      Third Right Posterolateral    3rd RPLB filled by collaterals from Dist Cx.            TRANSTHORACIC ECHOCARDIOGRAM: " This study was personally reviewed by me    No previous study for comparison.    Technically challenging study. Definity contrast utilized    Left ventricular systolic function appears lower limits of normal estimated visual ejection fraction of approximately 55%.    The following segments are akinetic: basal inferior and mid inferoseptal. The following segments are hypokinetic: basal inferolateral and mid inferior    Left Atrium: Left atrial volume is mildly increased    No observed significant valve abnormality    Pericardium: Trace pericardial effusion        IMPRESSION AND PLAN:  Mr. Gaona is a 76 year-old man with a recent non-ST elevation MI and severe multivessel coronary artery disease. Echocardiography demonstrates overall preserved left ventricular function but with inferior hypokinesis/akinesis. I recommend coronary artery bypass grafting. I discussed the technical details of the procedure with the patient, as well as the expected postoperative course and recovery. The risks include but are not limited to bleeding, infection, stroke, heart or graft failure, dysrhythmia, respiratory failure, kidney or liver injury, bowel or limb ischemia, and death. His calculated STS risk for mortality is 1.7% and the calculated risk of major morbidity or mortality is 15% with a 1.5% risk of stroke and a 6.2% risk of renal failure. The patient understands these risks and wishes to undergo the operation. Surgery will be performed by my colleague, Dr. Laila Marquis, later this week.      Thank you very much for this referral.       Xavier Baires 3/28/2017 3:13 PM

## 2021-06-09 NOTE — PROGRESS NOTES
Progress Note    Brief summary:   76 y.o. old male with history of hypertension, dyslipidemia, diabetes mellitus, history of CAD, had presented emergency with complaint of chest pain, found to have an NSTEMI with elevated troponin, underwent coronary angiogram which showed multiple vessel disease.CABG postponed secondary to thrombocytopenia. Hematology suggested likely myelodysplasia or mild ITP and cleared him for CABG. He underwent CABG X 3 on 4/5/17 with Dr. Marquis.    Assessment/Plan  Principal Problem:    NSTEMI (non-ST elevated myocardial infarction)  Active Problems:    Hypertension    Dyslipidemia    CAD (coronary artery disease)    Type 2 diabetes mellitus    Thrombocytopenia    Coronary artery disease due to lipid rich plaque    Essential hypertension    S/P CABG x 3      #S/p CABG X 3 POD2: currently intubated, mx per CV[facility] and intensivist      #NSTEMI (non-ST elevated myocardial infarction); CAD (coronary artery disease);s/p cardiac cath : Severe multivessel disease, including  of RCA; s/p CABG X 3. On atenolol, lipitor, losartan      #Thrombocytopenia: cleared for CABG by hematology.      #Essential Hypertension: controlled on Norvasc, Atenolol, Hyzaar      #Dyslipidemia: Continue Lipitor      #Type 2 diabetes mellitus: metformin on hold. Continue qid BG checks. Sugars well controlled without any treatment. A1c 3/28 was 6.0. Was on insulin drip, now switched to lantus      #SLOAN on home CPAP at night    Subjective  Patient seen and examined  Mild soreness at the surgical site  No other issues    Objective    Vital signs in last 24 hours  Temp:  [97.7  F (36.5  C)-98.6  F (37  C)] 98.6  F (37  C)  Heart Rate:  [76-92] 76  Resp:  [18-24] 20  BP: (112-162)/(54-72) 128/64  Weight:   218 lb 9.6 oz (99.2 kg)    Intake/Output last 3 shifts  I/O last 3 completed shifts:  In: 360 [P.O.:360]  Out: 1545 [Urine:1050; Chest Tube:495]  Intake/Output this shift:       Physical Exam  General appearance:alert, not in  distress  Eyes: conjunctivae/corneas clear. PERRL, EOM's intact. Fundi benign.  Lungs: clear to auscultation bilaterally  Heart: regular rate and rhythm, S1, S2 normal, no murmur, click, rub or gallop  Abdomen: soft, non-tender; bowel sounds normal; no masses, no organomegaly  Extremities: extremities normal, atraumatic, no cyanosis or edema    Meds    allopurinol  100 mg Oral Daily with brkfst     ascorbic acid (vitamin C)  500 mg Oral BID     atenolol  25 mg Oral DAILY     atorvastatin  20 mg Oral QPM     [START ON 4/8/2017] bisacodyl  10 mg Rectal Once     docusate sodium  100 mg Oral BID     ferrous sulfate  325 mg Oral BID with meals     insulin aspart (NovoLOG) injection   Subcutaneous TID with meals     insulin aspart (NovoLOG) injection   Subcutaneous QHS     latanoprost  1 drop Both Eyes QHS     magnesium hydroxide  30 mL Oral DAILY     melatonin  3 mg Oral QHS     metFORMIN  500 mg Oral BID with meals     omeprazole  20 mg Oral Daily before brkfst     polyethylene glycol  17 g Oral DAILY     tamsulosin  0.4 mg Oral Daily after supper       Pertinent Labs   Lab Results: personally reviewed.   not applicable      Results from last 7 days  Lab Units 04/07/17  1024 04/06/17  0413 04/05/17  1152  04/04/17  1039   LN-SODIUM mmol/L  --  143 140  --  139   LN-POTASSIUM mmol/L 4.6 4.5 4.5  < > 4.1   LN-CHLORIDE mmol/L  --  113* 113*  --  104   LN-CO2 mmol/L  --  21* 20*  --  26   LN-BLOOD UREA NITROGEN mg/dL  --  24 22  --  25   LN-CREATININE mg/dL  --  1.53* 1.07  --  1.38*   LN-CALCIUM mg/dL  --  7.5* 7.6*  --  9.6   < > = values in this interval not displayed.        Results from last 7 days  Lab Units 04/07/17  0516 04/06/17  0413 04/05/17  1152 04/05/17  0435 04/03/17  0547   LN-WHITE BLOOD CELL COUNT thou/uL  --  9.2 13.2*  --  7.4   LN-HEMOGLOBIN g/dL  --  7.8* 8.7* 11.9* 11.9*   LN-HEMATOCRIT %  --  25.4* 27.9*  --  37.6*   LN-PLATELET COUNT thou/uL 123* 126* 138* 107* 106*         Pertinent Radiology    Radiology Results: Personally reviewed impression/s    Xr Chest Ap Portable    Result Date: 4/6/2017  XR CHEST AP PORTABLE 4/6/2017 4:46 AM INDICATION: S/P CABG X 3 COMPARISON: 04/05/2017 at 1300 hours FINDINGS: Endotracheal tube has been removed. A [place]-Ree catheter terminates in the main PA. Subxiphoid drains remain. Small left greater than right bibasilar opacities likely represent atelectasis and pleural fluid; underlying airspace disease is not excluded. There is slightly increased edema from the prior study. The cardiomediastinal silhouette is enlarged.    Xr Chest Ap Portable    Result Date: 4/5/2017  XR CHEST AP PORTABLE 4/5/2017 11:59 AM INDICATION: S/P CABG X 3 COMPARISON: 3/28/2017 FINDINGS: A new endotracheal tube tip terminates 4.5 cm from the francine. A right IJ sheath transmits a PA catheter which terminates in the main pulmonary artery. Subxiphoid drains are present. Left basilar opacities likely represent atelectasis, though a  small 1 mL pleural fluid or airspace disease cannot be excluded. There is mild edema. The cardiomediastinal silhouette is enlarged. Sternotomy suture wires are intact.    Xr Chest Pa And Lateral    Result Date: 3/28/2017  XR CHEST PA AND LATERAL 3/28/2017 4:06 PM INDICATION: preop CABG COMPARISON: 3/28/2017 at 0337 hours FINDINGS: No pleural fluid or pneumothorax. There are no focal airspace opacities. Heterogeneous likely calcified opacities in the midlungs are nonspecific though could represent nodules, pleural calcifications, or musculoskeletal opacities. Recommend comparison with any prior studies. If none are available, nonemergent chest CT is recommended. There is no significant edema. The cardiomediastinal silhouette is at the upper limits of normal size.    Us Carotid Bilateral    Result Date: 3/28/2017  US CAROTID BILATERAL 3/28/2017 6:19 PM INDICATION: CAD preop CABG TECHNIQUE: Duplex exam performed utilizing 2D gray-scale imaging, Doppler interrogation with  color-flow and spectral waveform analysis. COMPARISON: None. FINDINGS: RIGHT: There is mild atheromatous plaque. Normal waveforms with no significant stenosis. LEFT: There is mild atheromatous plaque. Normal waveforms with no significant stenosis. There is abnormal waveform involving the right vertebral artery, with the appearance that of complete subclavian steal. The right subclavian artery waveform is normal. The left vertebral arteries and subclavian artery waveforms are normal. VELOCITY CHART: The following velocities were obtained in the RIGHT carotid system. CCA: 99/12 cm/s ICA: 99/25 cm/s ECA: 118/16 cm/s ICA/CCA: PS 0.99 The following velocities were obtained in the LEFT carotid system. CCA: 101/20 cm/s ICA: 90/29 cm/s ECA: 130/17 cm/s ICA/CCA: PS 0.89                            CONCLUSION: 1.  Mild atheromatous plaque in the carotid arteries. 2.  No significant stenosis in the carotid arteries. 3.  Abnormal right vertebral artery waveform, with the appearance suggestive of complete subclavian steal. Evaluation based on velocities and NASCET criteria. NOTE: ABNORMAL REPORT THE DICTATION ABOVE DESCRIBES AN ABNORMALITY FOR WHICH FOLLOW-UP IS NEEDED.     Us Abdomen Limited    Result Date: 3/31/2017  US ABDOMEN LIMITED 3/31/2017 6:09 PM INDICATION: thrombocytopenia, to assess spleen size TECHNIQUE: Routine. COMPARISON: None. FINDINGS: The spleen is normal size measuring 9.8 x 4.8 x 3.6 cm. No splenic lesion seen.           Advanced Care Planning:  Discharge Planning discussed with patient  Anticipated LOS: TBD  Barrier to discharge:post op recovery  Disposition:likely home  Discussed care with patient for >25 minutes with greater than 50% of time spent in counseling and coordination of care.      Delmy Diaz MD  Hospitalist  [phone]

## 2021-06-09 NOTE — CONSULTS
Nutrition Assessment/Consult    Date: 4/6/2017    Reason for Consult:   CV surgery  Patient admitted in the context of acute/chronic illness.    Nursing Admit Nutrition Risk Screen:   No risk    Medical Problems: NSTEMI (non-ST elevated myocardial infarction)  Principal Problem:    NSTEMI (non-ST elevated myocardial infarction)  Active Problems:    Hypertension    Dyslipidemia    CAD (coronary artery disease)    Type 2 diabetes mellitus    Thrombocytopenia    Coronary artery disease due to lipid rich plaque    Essential hypertension    S/P CABG x 3       Nutrition:  Data obtained from patient, care rounds, and the medical record. States he would like to lose some weight, but is noncompliant with diet modifications. Eats fried foods, uses salt and eats salty foods, and sweets. Don't think he checks his blood sugars.  lb  Starting clear liquid / no carb diet.    Physical Signs:  Obese    Anthropometrics:  Ht: 68 in         Wt: 219 lb           UBW : 230 lb          %IBW/ BMI: documented weights unreliable  Increased 17.9 lb per I&Os  Decreased 25 lb per documented weights         Laboratory Data:   Glu 108    Chaparro Score:   18    GI Function:  Bowel Sounds: hypoactive                  Last stool: 3/30    Estimated Needs:  Calories: 0463-1632                                    Protein: 100-200 gm                                     Fluids: per MD    Risk Assessment:   Patient admitted at low risk.    Malnutrition Assessment:  Patient does not meet criteria for malnutrition.    Nutrition dx:   Obesity  Increased needs for post-op wound healing.    Plan:    ADA/cardiac diet  Education after transfer out of the ICU    Goals:   Tolerate diet  Intake of 75%  Acquire understanding of the ADA/heart healthy eating principles        Goldie Hill RD

## 2021-06-09 NOTE — PROGRESS NOTES
"CV Surgery POD # 2 CAB    Subjective : resting in bed, had a good night, good pain control with oxycodone    Objective:  /57 (Patient Position: Lying)  Pulse 83  Temp 98  F (36.7  C) (Oral)   Resp 18  Ht 5' 8\" (1.727 m)  Wt 218 lb 9.6 oz (99.2 kg)  SpO2 92%  BMI 33.24 kg/m2     Weight: pending, yesterday 99.2, pre 110  Oxygen: 92% 2L NC    Physical Exam:  Neuro: A&O, no focal deficits  Heart: RRR no r/m  Lungs: clear anteriorly  Abdomen: large and soft +BS and flatus  Incisions: CDI  Extremities: warm, no edema    Ambulation: ambulated 60 feet yesterday    Chest Tube:  Chest tubes 535cc/24  hours    UO:  UO 400cc last shift, 700cc/24 hours    Labs:  Plt 123    Imaging:  CXR 4/6: Endotracheal tube has been removed. A [place]-Ree catheter terminates in the main PA. Subxiphoid drains remain. Small left greater than right bibasilar opacities likely represent atelectasis and pleural fluid; underlying airspace disease is not excluded. There is slightly increased edema from the prior study. The cardiomediastinal silhouette is enlarged.    Assessment:  1. Acute NSTEMI-s/p urgent CAB  2. Hypertension-stable on atenolol 25 mg qd  3. Hyperlipidemia-resume lipitor  4. Diabetes II controlled A1C 6.0%-back on metformin with SSI coverage  5. Thrombocytopenia-stable  6. Acute post op blood loss anemia-stable  7. Hypervolemia-today's weight pending, but looks to be below admit weight    Plan:  Will leave chest tubes for now d/t drainage  Titrate atenolol as needed  Bowel meds ordered  Cardiac rehab  Transfer to [facility] when bed is available    Mindy Willingham CNP  Pager [phone]        "

## 2021-06-09 NOTE — PROGRESS NOTES
Pharmacy Note - Admission Medication History       ______________________________________________________________________    Prior To Admission (PTA) med list completed and updated in EMR.       PTA Med List   Medication Sig Last Dose     allopurinol (ZYLOPRIM) 100 MG tablet Take 100 mg by mouth daily. 3/27/2017 at Unknown time     amLODIPine (NORVASC) 5 MG tablet Take 5 mg by mouth daily. 3/27/2017 at Unknown time     aspirin 325 MG tablet Take 325 mg by mouth every evening. 3/27/2017 at Unknown time     atenolol (TENORMIN) 25 MG tablet Take 25 mg by mouth daily. 3/27/2017 at Unknown time     atorvastatin (LIPITOR) 20 MG tablet Take 20 mg by mouth every morning. 3/27/2017 at Unknown time     irbesartan-hydrochlorothiazide (AVALIDE) 300-12.5 mg per tablet Take 1 tablet by mouth daily. 3/27/2017 at Unknown time     latanoprost (XALATAN) 0.005 % ophthalmic solution Administer 1 drop to both eyes at bedtime. 3/27/2017 at Unknown time     metFORMIN (GLUCOPHAGE) 500 MG tablet Take 500 mg by mouth 2 (two) times a day with meals. 3/27/2017 at Unknown time     niacin 250 MG tablet Take 250 mg by mouth 2 (two) times a day with meals. 3/27/2017 at Unknown time     tamsulosin (FLOMAX) 0.4 mg Cp24 Take 0.4 mg by mouth daily after supper. 3/27/2017 at Unknown time       Information source(s): Patient and Patient's pharmacy    Patient was asked about OTC/herbal products specifically.  PTA med list reflects this.    Based on the pharmacist s assessment, the PTA med list information appears reliable    Patient appears compliant: Yes    Allergies were reviewed, assessed, and updated with the patient.      Patient did not bring any medications to the hospital and can't retrieve from home. No multi-dose medications are available for use during hospital stay.     Thank you for the opportunity to participate in the care of this patient.    Sophy Velasco, PharmD  3/28/2017 10:51 AM

## 2021-06-09 NOTE — PROGRESS NOTES
Progress Note    Assessment/Plan  CAB cancelled for yesterday due to persistent thrombocytopenia despite transfusion of 2 pack of platelets   Dr Marquis has discussed with the patient and his family and agreed to cancel surgery  Hematology consulting and currently working up patient  Tentatively rescheduled for next Wednesday    Vivek Ryan

## 2021-06-09 NOTE — PROGRESS NOTES
[facility] Hematology/Oncology Inpatient progress note  Patient: Almas Gaona  MRN: [id number]  Date of Service: 4/4/2017       Chief complaint      Thrombocytopenia.  Anemia.    Assessment     1.  Thrombocytopenia.  This is most likely chronic.  I was able to review his records from care everywhere and he has had low platelet count going back to 2012.  This is unlikely to be a clinical problem.  He should be able to undergo any surgery with normal hemostasis.  2.  Slight normocytic normochromic anemia.  This is again most likely secondary to anemia of chronic disease.  He perhaps would need some transfusion if he bleeds a lot during surgery or postoperatively.  Otherwise he should do fine.  Expect the hemoglobin to drop to about 8.5 postop before it recover slowly over the next several days.    Plan     1.  At this point no contraindication to proceed with surgery.  2.  Let us know if we can be of some help.  Will sign off at this time.    HPI    Almas Gaona is a 76 y.o. old male we will consulted to evaluate thrombocytopenia prior to him needing a coronary artery bypass grafting.  He came into the hospital with acute coronary syndrome.  Evaluation relieved thrombocytopenia.  Platelet count were in the low 90s.  This was associated with slightly low hemoglobin of about 12.2.  Normal white count.  Other medical conditions stable.    His workup has been pretty negative.  No evidence of any vitamin B12 or folic acid deficiency.  I reviewed his labs from his prior hospitalizations at [facility] and his hemoglobin has been slightly low but more importantly his platelet count has been in the low 100s going back to 2012.  This indicate chronic low platelet count.  It perhaps could be secondary to chronic ITP low-grade type.  This typically does not present with any hemostatic issues.    Review of system      Detailed pertinent 8 system review of systems was done.  Findings noted.      Past Medical, family and  social history      hypertension, hyperlipidemia, type 2 diabetes.  Now coronary artery disease.    Physical exam      Vitals:    04/04/17 1923   BP: 123/61   Pulse: (!) 59   Resp: 18   Temp: 97.8  F (36.6  C)   SpO2: 98%     GENERAL: no acute distress. Cooperative in conversation.   HEENT: pupils are equal, round and reactive. Oral mucosa is moist and intact.  RESP:Chest symmetric. Regular respiratory rate. No stridor.  ABD: Nondistended, soft.  EXTREMITIES: No lower extremity edema.   NEURO: non focal. Alert and oriented x3.   PSYCH: within normal limits. No depression or anxiety.  SKIN: warm dry intact     Labs and radiology      Recent Results (from the past 24 hour(s))   POCT Glucose   Result Value Ref Range    Glucose, POC 96 mg/dL   Magnesium   Result Value Ref Range    Magnesium 2.0 1.8 - 2.6 mg/dL   Potassium - Next AM   Result Value Ref Range    Potassium 4.2 3.5 - 5.0 mmol/L   POCT Glucose   Result Value Ref Range    Glucose,  mg/dL   Echo Monitor RAYMOND   Result Value Ref Range    BSA 2.3 m2    Hieght 68 in    Weight 3684.8 lbs    /67 mmHg    HR 55 bpm   Type and Screen   Result Value Ref Range    ABORh AB POS     Antibody Screen Negative Negative   INR   Result Value Ref Range    INR 1.05 0.90 - 1.10   Basic Metabolic Panel   Result Value Ref Range    Sodium 139 136 - 145 mmol/L    Potassium 4.1 3.5 - 5.0 mmol/L    Chloride 104 98 - 107 mmol/L    CO2 26 22 - 31 mmol/L    Anion Gap, Calculation 9 5 - 18 mmol/L    Glucose 124 70 - 125 mg/dL    Calcium 9.6 8.5 - 10.5 mg/dL    BUN 25 8 - 28 mg/dL    Creatinine 1.38 (H) 0.70 - 1.30 mg/dL    GFR MDRD Af Amer >60 >60 mL/min/1.73m2    GFR MDRD Non Af Amer 50 (L) >60 mL/min/1.73m2   Prealbumin   Result Value Ref Range    Prealbumin 28.3 19.0 - 38.0 mg/dL   Magnesium   Result Value Ref Range    Magnesium 2.1 1.8 - 2.6 mg/dL   Crossmatch   Result Value Ref Range    Crossmatch Compatible     Blood Expiration Date [id number]     Unit Type AB Pos      Unit Number [id number]     Status Ready     Component Red Blood Cells     PRODUCT CODE U1568G97     Blood Type 8400     CODING SYSTEM [id number]    Crossmatch   Result Value Ref Range    Crossmatch Compatible     Blood Expiration Date [id number]     Unit Type AB Pos     Unit Number [id number]     Status Ready     Component Red Blood Cells     PRODUCT CODE O1451G58     Blood Type 8400     CODING SYSTEM [id number]    POCT Glucose   Result Value Ref Range    Glucose,  mg/dL   POCT Glucose   Result Value Ref Range    Glucose,  mg/dL   Urinalysis-UC if Indicated   Result Value Ref Range    Color, UA Straw Colorless, Yellow, Straw, Light Yellow    Clarity, UA Clear Clear    Glucose, UA Negative Negative    Bilirubin, UA Negative Negative    Ketones, UA Negative Negative    Specific Gravity, UA 1.007 1.001 - 1.030    Blood, UA Negative Negative    pH, UA 6.0 4.5 - 8.0    Protein,  mg/dL (!) Negative mg/dL    Urobilinogen, UA <2.0 E.U./dL <2.0 E.U./dL, 2.0 E.U./dL    Nitrite, UA Negative Negative    Leukocytes, UA Negative Negative    Bacteria, UA Few (!) None Seen hpf    RBC, UA 0-2 None Seen, 0-2 hpf    WBC, UA 0-5 None Seen, 0-5 hpf    Squam Epithel, UA 5-10 (!) None Seen, 0-5 lpf    Mucus, UA Few (!) None Seen lpf         No results found.      Quinton Miguel MD

## 2021-06-09 NOTE — PROGRESS NOTES
[facility] Hematology and Oncology Inpatient Progress Note    Patient: Almas Gaona  MRN: [id number]  Date of Service: 4/2/2017        Reason for Visit    1.  Thrombocytopenia    Assessment and Plan    1.  Thrombocytopenia:  No definitive etiology from his peripheral blood evaluation.  It is noted that the patient did have a low platelet count in May 2016.  Likely etiologies are myelodysplasia or mild immune mediated thrombocytopenia.  I do not think there is going to be any better way than transfusions to increase his platelet count in the short-term.  His platelet count has been hovering around 100,000 which should provide adequate hemostasis for cardiac surgery.  If needed, he could receive a unit just prior to the procedure and as needed afterwards.  We will otherwise recommend following his counts every 4-6 months.  If he develops worsening thrombocytopenia or a drop in his other blood counts and he should have a bone marrow biopsy.    History of Present Illness    Mr. Almas Gaona is a 76-year-old gentleman who was admitted to the hospital for cardiac surgery.  He was found to have a mild thrombocytopenia.  The procedure was canceled secondary to this finding.  Hematology was consulted.  Workup has been initiated.  The patient is anxious to have his surgery done.  No acute complaints today.    Review of Systems    As above in the history.     Review of Systems otherwise Negative for:  General: chills, fever or night sweats  Psychological: anxiety or depression  Ophthalmic: blurry vision, double vision or loss of vision, vision change  ENT: epistaxis, oral lesions, hearing changes  Hematological and Lymphatic: bleeding, bruising, jaundice, swollen lymph nodes  Endocrine: hot flashes, malaise/lethargy or unexpected weight changes  Respiratory: cough, hemoptysis, orthopnea or shortness of breath/GARCIA  Cardiovascular: chest pain, edema, palpitations or PND  Gastrointestinal: abdominal pain, blood in stools,  change in bowel habits, constipation, diarrhea or nausea/vomiting  Genito-Urinary: change in urinary stream, incontinence, frequency/urgency  Musculoskeletal: joint pain, stiffness, swelling, muscle pain or weakness  Neurological: dizziness, headaches, numbness/tingling  Dermatological: lumps and rash    ECOG performance status is 0    Physical Exam    Recent Vitals 4/2/2017   Height -   Weight -   BSA (m2) -   /68   Pulse 60   Temp 97.8   Temp src 1   SpO2 98     General: patient appears stated age of 76 y.o.. Nontoxic and in no distress.   HEENT: Head: atraumatic, normocephalic. Sclerae anicteric.  Chest:  Normal respiratory effort  Cardiac:  No edema.   Abdomen: abdomen is soft, non-distended  Extremities: normal tone and muscle bulk.  Skin: no lesions or rash. Warm and dry.   CNS: alert and oriented x3. Grossly non-focal.   Psychiatric: normal mood and affect.     Lab Results      Results from last 7 days  Lab Units 04/01/17  0704 03/31/17  1922 03/31/17  0403   LN-WHITE BLOOD CELL COUNT thou/uL 7.1 7.0 6.9   LN-HEMOGLOBIN g/dL 12.0* 12.9* 11.5*   LN-HEMATOCRIT % 37.6* 39.3* 35.7*   LN-PLATELET COUNT thou/uL 97* 103* 91*   LN-NEUTROPHILS RELATIVE PERCENT % 65  --   --    LN-TOTAL NEUTROPHILS-REL(DIFF) %  --  67  --    LN-LYMPHOCYTES RELATIVE PERCENT % 20  --   --    LN-LYMPHOCYTES - REL (DIFF) %  --  24  --    LN-MONOCYTES RELATIVE PERCENT % 8  --   --    LN-MONOCYTES - REL (DIFF) %  --  4  --        Results from last 7 days  Lab Units 04/02/17  0610 04/01/17  0704 03/31/17  0828  03/30/17  0617 03/28/17  0944   LN-SODIUM mmol/L  --   --  139  --  137 138   LN-POTASSIUM mmol/L 4.5 4.4 4.4  < > 4.5 4.6   LN-CHLORIDE mmol/L  --   --  105  --  105 109*   LN-CO2 mmol/L  --   --  23  --  22 20*   LN-BLOOD UREA NITROGEN mg/dL  --   --  21  --  23 29*   LN-CREATININE mg/dL  --   --  1.29  --  1.23 1.25   LN-CALCIUM mg/dL  --   --  9.5  --  9.3 8.6   LN-ALBUMIN g/dL  --   --  3.5  --   --   --    LN-PROTEIN TOTAL  g/dL  --   --  7.6  --   --   --    LN-BILIRUBIN TOTAL mg/dL  --   --  0.4  --   --   --    LN-ALKALINE PHOSPHATASE U/L  --   --  80  --   --   --    LN-ALT (SGPT) U/L  --   --  28  --   --   --    LN-AST (SGOT) U/L  --   --  33  --   --   --    < > = values in this interval not displayed.    Imaging    Xr Chest Pa And Lateral    Result Date: 3/28/2017  XR CHEST PA AND LATERAL 3/28/2017 4:06 PM INDICATION: preop CABG COMPARISON: 3/28/2017 at 0337 hours FINDINGS: No pleural fluid or pneumothorax. There are no focal airspace opacities. Heterogeneous likely calcified opacities in the midlungs are nonspecific though could represent nodules, pleural calcifications, or musculoskeletal opacities. Recommend comparison with any prior studies. If none are available, nonemergent chest CT is recommended. There is no significant edema. The cardiomediastinal silhouette is at the upper limits of normal size.    Us Carotid Bilateral    Result Date: 3/28/2017  US CAROTID BILATERAL 3/28/2017 6:19 PM INDICATION: CAD preop CABG TECHNIQUE: Duplex exam performed utilizing 2D gray-scale imaging, Doppler interrogation with color-flow and spectral waveform analysis. COMPARISON: None. FINDINGS: RIGHT: There is mild atheromatous plaque. Normal waveforms with no significant stenosis. LEFT: There is mild atheromatous plaque. Normal waveforms with no significant stenosis. There is abnormal waveform involving the right vertebral artery, with the appearance that of complete subclavian steal. The right subclavian artery waveform is normal. The left vertebral arteries and subclavian artery waveforms are normal. VELOCITY CHART: The following velocities were obtained in the RIGHT carotid system. CCA: 99/12 cm/s ICA: 99/25 cm/s ECA: 118/16 cm/s ICA/CCA: PS 0.99 The following velocities were obtained in the LEFT carotid system. CCA: 101/20 cm/s ICA: 90/29 cm/s ECA: 130/17 cm/s ICA/CCA: PS 0.89                            CONCLUSION: 1.  Mild atheromatous  plaque in the carotid arteries. 2.  No significant stenosis in the carotid arteries. 3.  Abnormal right vertebral artery waveform, with the appearance suggestive of complete subclavian steal. Evaluation based on velocities and NASCET criteria. NOTE: ABNORMAL REPORT THE DICTATION ABOVE DESCRIBES AN ABNORMALITY FOR WHICH FOLLOW-UP IS NEEDED.     Us Abdomen Limited    Result Date: 3/31/2017  US ABDOMEN LIMITED 3/31/2017 6:09 PM INDICATION: thrombocytopenia, to assess spleen size TECHNIQUE: Routine. COMPARISON: None. FINDINGS: The spleen is normal size measuring 9.8 x 4.8 x 3.6 cm. No splenic lesion seen.       Signed by: Almas Freeman MD

## 2021-06-09 NOTE — PROGRESS NOTES
"Cardiology Progress Note    Assessment:    Non-ST segment elevation myocardial infarction, hemodynamically stable, pain-free   Coronary artery disease with remote history of circumflex stenting, three-vessel involvement including total occlusion of RCA  Hypertension, good control  Diabetes mellitus  Thrombocytopenia    Plan:  CABG tomorrow    Subjective:   Denies recurrent chest pain or shortness of breath    Objective:   /76 (Patient Position: Sitting)  Pulse (!) 58  Temp 97.4  F (36.3  C) (Oral)   Resp 20  Ht 5' 8\" (1.727 m)  Wt (!) 241 lb (109.3 kg)  SpO2 98%  BMI 36.64 kg/m2    Intake/Output Summary (Last 24 hours) at 03/30/17 1351  Last data filed at 03/30/17 0600   Gross per 24 hour   Intake               74 ml   Output              300 ml   Net             -226 ml         Physical Exam:  GENERAL: no distress  NECK: No JVD  LUNGS: Clear to auscultation.  CARDIAC: regular  rhythm, S1 & S2 normal.  No heaves, thrills, gallops or murmurs.  ABDOMEN: flat, negative hepatosplenomegaly, soft and non-tender.  EXTREMITIES: No evidence of cyanosis, clubbing or edema.    Current Facility-Administered Medications   Medication Dose Route Frequency Provider Last Rate Last Dose     acetaminophen tablet 500-1,000 mg (TYLENOL)  500-1,000 mg Oral Q4H PRN Sophy Quick MD         amLODIPine tablet 5 mg (NORVASC)  5 mg Oral DAILY TARAN Steel   5 mg at 03/30/17 0915     aspirin chewable tablet 81 mg  81 mg Oral DAILY Britta Regis, CNP   81 mg at 03/30/17 0915     atenolol tablet 25 mg (TENORMIN)  25 mg Oral DAILY TARAN Steel   25 mg at 03/30/17 0915     atorvastatin tablet 80 mg (LIPITOR)  80 mg Oral DAILY Sophy Quick MD   80 mg at 03/30/17 0915     dextrose 50 % (D50W) syringe 20-50 mL  20-50 mL Intravenous PRN TARAN Steel         glucagon (human recombinant) injection 1 mg  1 mg Subcutaneous PRN TARAN Steel         latanoprost 0.005 % ophthalmic solution 1 " drop (XALATAN)  1 drop Both Eyes QHS KevinTARAN Blount   1 drop at 03/29/17 2047     lidocaine 10 mg/mL (1 %) injection 0.1-0.3 mL  0.1-0.3 mL Subcutaneous PRN Vivek Ryan PA-C         losartan-hydrochlorothiazide (HYZAAR) 100-12.5 mg combo dose (INP)   Oral DAILY TARAN Steel         morphine injection 1-2 mg  1-2 mg Intravenous Q3H PRN Sophy Quick MD         mupirocin 2 % ointment 1 application (BACTROBAN)  1 application Nasal BID Vivek Ryan PA-C   1 application at 03/30/17 1230     naloxone injection 0.2-0.4 mg (NARCAN)  0.2-0.4 mg Intravenous PRN Rudi Arzate (Ulisses) MD Robert        Or     naloxone injection 0.2-0.4 mg (NARCAN)  0.2-0.4 mg Intramuscular PRN Rudi Arzate (Ulisses) MD Robert         niacin tablet 250 mg  250 mg Oral BID with meals Kevinwamario IGNACIO aSntoyoBS   250 mg at 03/30/17 0914     nitroglycerin SL tablet 0.4 mg (NITROSTAT)  0.4 mg Sublingual Q5 Min PRN KevinTARAN Blount         oxyCODONE immediate release tablet 5-10 mg (ROXICODONE)  5-10 mg Oral Q4H PRN Sophy Quick MD         sodium chloride 0.9 % flush 3 mL (NS)  3 mL Intravenous Line Care Vivek Ryan PA-C         sodium chloride 0.9%  25 mL/hr Intravenous Continuous Vivek Ryan PA-C         tamsulosin 24 hr capsule 0.4 mg (FLOMAX)  0.4 mg Oral Daily after supper Renu Yarelis MBBS   0.4 mg at 03/29/17 1804       Cardiographics:    Telemetry: Normal sinus rhythm    Echocardiogram:    Left ventricular systolic function appears lower limits of normal estimated visual ejection fraction of approximately 55%.    The following segments are akinetic: basal inferior and mid inferoseptal. The following segments are hypokinetic: basal inferolateral and mid inferior    Left Atrium: Left atrial volume is mildly increased    No observed significant valve abnormality    Pericardium: Trace pericardial effusion    Coronary angio:    Severe, focal LAD and circumflex disease with a  calcified chronic occlusion of the proximal and mid RCA with good collateral flow to the distal RCA system  Lab Results:     Results from last 7 days  Lab Units 03/30/17  0617   LN-SODIUM mmol/L 137   LN-POTASSIUM mmol/L 4.5   LN-CHLORIDE mmol/L 105   LN-CO2 mmol/L 22   LN-BLOOD UREA NITROGEN mg/dL 23   LN-CREATININE mg/dL 1.23   LN-CALCIUM mg/dL 9.3       Results from last 7 days  Lab Units 03/30/17  0617 03/28/17  0944   LN-WHITE BLOOD CELL COUNT thou/uL 6.5 8.3   LN-HEMOGLOBIN g/dL 12.3* 12.1*   LN-HEMATOCRIT % 39.1* 38.0*   LN-PLATELET COUNT thou/uL 77* 85*     No results found for: BNP  Lab Results   Component Value Date    INR 0.96 03/29/2017    INR 0.94 03/28/2017     Lab Results   Component Value Date    TROPONINI 6.19 () 03/28/2017    TROPONINI 6.20 () 03/28/2017    TROPONINI 2.11 () 03/28/2017       Rudi (Ulisses)  MD Robert

## 2021-06-09 NOTE — PROGRESS NOTES
Respiratory care: RCAT was done today, pt scored acuity level of 2. Pt breathing 20, BS clear , SpO2 remains mid 90s on 1L NC. Pt has strong productive cough. RT will continue to encourage pt deep breath and cough with volume expansion therapy (IS). Pt also instructed for IS post extubation and able to pull 750ml.   RT will continue to monitor.       04/05/17 1954   Reason for Assessment (RCAT)   RCAT Assesment Initial   Assessment Reason  Cardiac surgery   $ RCAT Eval Time 15 min.  Yes   Vitals (RCAT)   Heart Rate 80   Resp 22   SpO2 94 %   FiO2 (%) 24 %   Chart Assessment (RCAT)   Pulmonary Status  1   Surgical Status  3   Chest Xray  2   Patient Assessment (RCAT)    Respiratory Pattern  0   Mental Status  0   Breath Sounds  0   Cough Effectiveness  1   Level of Activity  1   O2 Required SpO2 >= 92%  1   Chart + Pt. Assessment Total Points  9   RCAT Acuity Score 9 (Acuity 2)   Clinical Indications (RCAT)   Aerosol Hygiene RCAT protocol   RCAT Order Placed  Meron Montoya

## 2021-06-09 NOTE — PROGRESS NOTES
Pt unable to void all day after balderrama removed this am. Bladder scanned for 250. Straight cath as per order for 400 cc out. Updated CV surgery (Al Shelby) to update. Will continue to monitor.     Sol Velarde RN

## 2021-06-09 NOTE — CONSULTS
PULMONARY / CRITICAL CARE CONSULTATION NOTE      Consultation -   Almas Gaona,  1940, MRN [id number]  Date / Time of Admission:  3/28/2017  8:58 AM    Admitting Dx: NSTEMI (non-ST elevated myocardial infarction) [I21.4]  Coronary artery disease [I25.10]  Coronary artery disease [I25.10]    PCP: David Mclean MD, [phone]  Consulting physician:  Delmy Diaz MD   Code status:  Full Code       Extended Emergency Contact Information  Primary Emergency Contact: Kaylee Gaona   [facility]  Home Phone: [phone]  Relation: Spouse       Assessment   1. NSTEMI  2. Multivessel CAD  3. S/p CABG   4. DM  5. HTN  6. Dyslipidemia  7. Throbocytopenia  8. SLOAN    Plan   Systems to Assess:   Pulmonary: Hx SLOAN.    -Check ABG and adjust ventilator as needed   -Wean supplemental O2 as tolerated; goal O2 sat > 92%.    -HOB > 30 degrees to limit aspiration risk.    -PST when awake and hemodynamically stable   -Will resume home CPAP after extubation  Cardiovascular:    -Cardiac monitoring.    -Back up pacing if needed (50 bpm epicardial wires)   -Currently low dose levophed.  Neurological:    -Neuro checks per ICU protocol.    -Allow to wake fully from anesthesia/sedation.     -Pain control: aggressive while avoiding oversedation.   GI/:    -NPO for now until fully awake.    -Initiate bowel protocol early in setting of narcotic pain medications.    -GI prophylaxis: PPI  Renal:   -Monitor I/O's. Electrolyte repletion PRN. Avoid/limit nephrotoxic agents.   Heme/Coag: Hx. Thrombocytopenia.  Received 3 units platlets and 1 cryoprecipitate during the case    -Monitor H/H. Monitor chest tube output hourly. Transfuse as needed.    -DVT prophylaxis: scds/subcutaneous heparin   Infectious disease:    -General precautions. Remove lines and drains as soon as no longer needed.   Endocrine: FSBG Q4H. Initiate critical care insulin if blood sugars > 180 x 2.   Musculoskeletal: bedrest for now. Increase  activity as he wakes up.     Lines: right IJ CVC with SG, arterial line left brachial, balderrama catheter, 7.5 ETT, chest tubes X 3 all placed on 4/5/2017,    Activity: bedrest until awake and hemodynamically stable    The patient and/or the family were educated about the above plan of care.  They indicated understanding.  This patient is considered critically ill and requires ICU level of care due to immediate post-op cardiovascular surgery. High risk for hemodynamic collapse and death    TOTAL CRITICAL CARE TIME:  35 minutes.    Zoë Valencia CNP  Pulmonary & Critical Care Medicine  4/5/2017  11:16 AM    ICU DAILY CHECKLIST   Can patient transfer out of MICU? no    FAST HUG:    Feeding:  npo  Balderrama: yes  Analgesia/Sedation: precedex  Thromboembolic prophylaxis:  Scds/subcutaneous heparin  HOB>30:  yes  Stress Ulcer Protocol: ppi  Glycemic Control:  Ssi/insulin drip if needed    INTUBATED:  Can patient have daily waking: yes  Can patient have spontaneous breathing trial: yes    Restraints? yes      Chief Complaint NSTEMI (non-ST elevated myocardial infarction)  S/p CABG      HPI    Almas Gaona is a 76 y.o. old male with a history of dyslipidemia, CAD with left circumflex stent placement in the past, HTN, DM, chronic thromobocytopenia was admitted on 3/28/2017 with NSTEMI.  Coronary angiogram demonstrated multivessel CAD including   of the RCA.  Transthoracic echocardiogram on 3/28/2017 demonstrated an estimated LVEF of 55%, akinesis of thebasal inferior and mid inferoseptal wall and hypokinetsis of the basal inferolateral and mid inferior wall.  He underwent CABG X today.  Of note Hematology was consulted for thrombocytopenia.  They felt it to be chronic with no further work up needed.    History is provided by review of the records.       Review of Systems   Pertinent items are noted in HPI.     Active Problem List   Patient Active Problem List    Diagnosis Date Noted     Coronary artery disease due to lipid rich  plaque      Essential hypertension      Thrombocytopenia      NSTEMI (non-ST elevated myocardial infarction) 03/28/2017     Hypertension 03/28/2017     Dyslipidemia 03/28/2017     CAD (coronary artery disease) 03/28/2017     Type 2 diabetes mellitus 03/28/2017        Medical/Surgical History   History reviewed. No pertinent past medical history.  Past Surgical History:   Procedure Laterality Date     CARDIAC CATHETERIZATION N/A 3/28/2017    Procedure: Coronary Angiogram;  Surgeon: Sophy Quick MD;  Location: [facility] Cath Lab;  Service:      WY L HRT CATH W/NJX L VENTRICULOGRAPHY IMG S&I N/A 3/28/2017    Procedure: Left Heart Catheterization with Left Ventriculogram;  Surgeon: Sophy Quick MD;  Location: [facility] Cath Lab;  Service: Cardiology        Allergies   No Known Allergies     Medications:  OUTpatient medications   Prior to Admission medications    Medication Sig Start Date End Date Taking? Authorizing Provider   allopurinol (ZYLOPRIM) 100 MG tablet Take 100 mg by mouth daily.   Yes PROVIDER, HISTORICAL   amLODIPine (NORVASC) 5 MG tablet Take 5 mg by mouth daily.   Yes PROVIDER, HISTORICAL   aspirin 325 MG tablet Take 325 mg by mouth every evening.   Yes PROVIDER, HISTORICAL   atenolol (TENORMIN) 25 MG tablet Take 25 mg by mouth daily.   Yes PROVIDER, HISTORICAL   atorvastatin (LIPITOR) 20 MG tablet Take 20 mg by mouth every morning.   Yes PROVIDER, HISTORICAL   irbesartan-hydrochlorothiazide (AVALIDE) 300-12.5 mg per tablet Take 1 tablet by mouth daily.   Yes PROVIDER, HISTORICAL   latanoprost (XALATAN) 0.005 % ophthalmic solution Administer 1 drop to both eyes at bedtime.   Yes PROVIDER, HISTORICAL   metFORMIN (GLUCOPHAGE) 500 MG tablet Take 500 mg by mouth 2 (two) times a day with meals.   Yes PROVIDER, HISTORICAL   niacin 250 MG tablet Take 250 mg by mouth 2 (two) times a day with meals.   Yes PROVIDER, HISTORICAL   tamsulosin (FLOMAX) 0.4 mg Cp24 Take 0.4 mg by mouth daily after supper.    "Yes PROVIDER, HISTORICAL          Medications:  INpatient medications     [MAR Hold] amLODIPine  5 mg Oral DAILY     [MAR Hold] atenolol  25 mg Oral DAILY     [MAR Hold] atorvastatin  80 mg Oral DAILY     [MAR Hold] latanoprost  1 drop Both Eyes QHS     [MAR Hold] losartan-hydrochlorothiazide (HYZAAR) 100-12.5 mg combo dose (INP)   Oral DAILY     magnesium sulfate IVPB  2 g Intravenous Once     mupirocin  1 application Nasal BID     sodium chloride  3 mL Intravenous Line Care     sodium chloride  3 mL Intravenous Line Care     [MAR Hold] tamsulosin  0.4 mg Oral Daily after supper          Family History Social History     Family History   Problem Relation Age of Onset     Diabetes Brother        Social History     Social History     Marital status:      Spouse name: N/A     Number of children: N/A     Years of education: N/A     Occupational History     Not on file.     Social History Main Topics     Smoking status: Former Smoker     Packs/day: 1.00     Years: 30.00     Types: Cigarettes     Start date: 3/28/1961     Quit date: 3/28/1991     Smokeless tobacco: Not on file     Alcohol use No      Comment: occasional drinks     Drug use: No     Sexual activity: Not on file     Other Topics Concern     Not on file     Social History Narrative      Psychosocial Needs  Social History     Social History Narrative     Additional psychosocial needs reviewed per nursing assessment.     PHYSICAL EXAM   VITALS:  /77 (Patient Position: Lying)  Pulse (!) 58  Temp 97.1  F (36.2  C) (Oral)   Resp 20  Ht 5' 8\" (1.727 m)  Wt (!) 230 lb 14.4 oz (104.7 kg)  SpO2 96%  BMI 35.11 kg/m2  Temp:  [97.1  F (36.2  C)-97.8  F (36.6  C)] 97.1  F (36.2  C)  Heart Rate:  [48-59] 58  Resp:  [18-20] 20  BP: (123-157)/(61-77) 153/77  SpO2:  [96 %-100 %] 96 %    PHYSICAL EXAM:   General:  Lying in bed on the ventilator NAD  Skin:  Midline alex rnotomy intact  Head:  Normocphalic, atraumatic  Eyes: EOM's intact, sclera " anicteric  Mouth/Throat:  Oropharynx clear  Neck:  Supple, trachea midline  Heart:  S1S2, RRR, pericardiala rub present  Lungs: CTA  Abdomen:  Round, soft, nondistended, bowel sounds active all four quadrants  Vascular:  No LE edema  Neurologic:  Sedated, grossly nonfocal     I&O:    Intake/Output Summary (Last 24 hours) at 04/05/17 1116  Last data filed at 04/05/17 1110   Gross per 24 hour   Intake          5477.33 ml   Output             1950 ml   Net          3527.33 ml       Pertinent Labs   Lab Results: personally reviewed.     Serum Glucose range:  Recent Labs      04/05/17   0804  04/05/17   0936  04/05/17   1027  04/05/17   1048   POCGLU  100  181  181  172     ABG:  ABGs: No results found for: PH  CBC:    Results from last 7 days  Lab Units 04/05/17  0435 04/03/17  0547 04/01/17  0704 03/31/17  1922   LN-WHITE BLOOD CELL COUNT thou/uL  --  7.4 7.1 7.0   LN-HEMOGLOBIN g/dL 11.9* 11.9* 12.0* 12.9*   LN-HEMATOCRIT %  --  37.6* 37.6* 39.3*   LN-PLATELET COUNT thou/uL 107* 106* 97* 103*   LN-NEUTROPHILS RELATIVE PERCENT %  --  64 65  --    LN-MONOCYTES RELATIVE PERCENT %  --  8 8  --    LN-MONOCYTES - REL (DIFF) %  --   --   --  4     Chemistry:    Results from last 7 days  Lab Units 04/05/17  0435 04/04/17  1039 04/04/17  0712  03/31/17  0828  03/30/17  0617   LN-SODIUM mmol/L  --  139  --   --  139  --  137   LN-POTASSIUM mmol/L 4.3 4.1 4.2  < > 4.4  < > 4.5   LN-CHLORIDE mmol/L  --  104  --   --  105  --  105   LN-CO2 mmol/L  --  26  --   --  23  --  22   LN-BLOOD UREA NITROGEN mg/dL  --  25  --   --  21  --  23   LN-CREATININE mg/dL  --  1.38*  --   --  1.29  --  1.23   LN-CALCIUM mg/dL  --  9.6  --   --  9.5  --  9.3   LN-MAGNESIUM mg/dL 2.2 2.1 2.0  < >  --   < > 3.4*   LN-ALBUMIN g/dL  --   --   --   --  3.5  --   --    LN-ALT (SGPT) U/L  --   --   --   --  28  --   --    LN-AST (SGOT) U/L  --   --   --   --  33  --   --    LN-ALKALINE PHOSPHATASE U/L  --   --   --   --  80  --   --    LN-BILIRUBIN TOTAL  mg/dL  --   --   --   --  0.4  --   --    < > = values in this interval not displayed.  Coags:  Lab Results   Component Value Date    INR 1.05 04/04/2017    INR 0.96 03/29/2017    INR 0.94 03/28/2017           Microbiology:  MRSA on 3/30/2017 negative     Pertinent Radiology   Radiology Results:   Chest X-Ray:  XR CHEST PA AND LATERAL  3/28/2017 4:06 PM     INDICATION: preop CABG  COMPARISON: 3/28/2017 at 0337 hours     FINDINGS: No pleural fluid or pneumothorax. There are no focal airspace opacities. Heterogeneous likely calcified opacities in the midlungs are nonspecific though could represent nodules, pleural calcifications, or musculoskeletal opacities.   Recommend   comparison with any prior studies. If none are available, nonemergent chest CT is recommended. There is no significant edema. The cardiomediastinal silhouette is at the upper limits of normal size.  EKG Results: 3/30/2017 NSR with PVCs       Outside reports reviewed: historical medical records

## 2021-06-09 NOTE — PROGRESS NOTES
Progress Note    Assessment/Plan  Status CAD, CABG cancelled Firday due to persistent thrombocytopenia despite transfusion of 2 pack of platelets   Hematology consulting and currently working up patient  Awaiting final hematology instruction /directives  Tentatively rescheduled for next Wednesday     Vivek Ryan

## 2021-06-09 NOTE — PROGRESS NOTES
Progress Note    Assessment/Plan  NSTEMI (non-ST elevated myocardial infarction); CAD (coronary artery disease);s/p cardiac cath : Severe multivessel disease, including  of RCA; plan is for CABG Wednesday, 4/5/17. On atenolol, lipitor, losartan  Thrombocytopenia: cleared for CABG by hematology. ASA on hold. Heme recommends a unit of platelets just prior to procedure if needed and as needed afterwards.   Essential Hypertension: controlled on Norvasc, Atenolol, Hyzaar  Dyslipidemia: Continue Lipitor  Type 2 diabetes mellitus: metformin on hold. Continue qid BG checks. Sugars well controlled without any treatment. A1c 3/28 was 6.0  SLOAN on home CPAP at night    Barrier: Planned CABG Wednesday 4/5/17  Dispposition: TBD          Subjective  Patient resting comfortably, ROS negative    Objective    Vital signs in last 24 hours  Temp:  [97.4  F (36.3  C)-98.2  F (36.8  C)] 97.7  F (36.5  C)  Heart Rate:  [48-61] 61  Resp:  [16-20] 16  BP: (117-141)/(61-78) 117/78  Weight:   (!) 231 lb 14.4 oz (105.2 kg)    Intake/Output last 3 shifts     Intake/Output this shift:       Physical Exam  General appearance: alert and cooperative  Lungs: clear to auscultation bilaterally  Heart: regular rate and rhythm, S1, S2 normal  Abdomen: soft, non-tender  Extremities: atraumatic, no cyanosis or edema  Neurologic: moving all 4 extremities spontaneously, non focal            Meds    nacl 0.9% 25 mL/hr (03/31/17 0600)       amLODIPine  5 mg Oral DAILY     atenolol  25 mg Oral DAILY     atorvastatin  80 mg Oral DAILY     latanoprost  1 drop Both Eyes QHS     losartan-hydrochlorothiazide (HYZAAR) 100-12.5 mg combo dose (INP)   Oral DAILY     mupirocin  1 application Nasal BID     sodium chloride  3 mL Intravenous Line Care     tamsulosin  0.4 mg Oral Daily after supper     Pertinent Labs   Lab Results: personally reviewed.   not applicable      Results from last 7 days  Lab Units 04/03/17  0547 04/02/17  0610 04/01/17  0704 03/31/17  0899   03/30/17  0617 03/28/17  0944   LN-SODIUM mmol/L  --   --   --  139  --  137 138   LN-POTASSIUM mmol/L 4.2 4.5 4.4 4.4  < > 4.5 4.6   LN-CHLORIDE mmol/L  --   --   --  105  --  105 109*   LN-CO2 mmol/L  --   --   --  23  --  22 20*   LN-BLOOD UREA NITROGEN mg/dL  --   --   --  21  --  23 29*   LN-CREATININE mg/dL  --   --   --  1.29  --  1.23 1.25   LN-CALCIUM mg/dL  --   --   --  9.5  --  9.3 8.6   < > = values in this interval not displayed.        Results from last 7 days  Lab Units 04/03/17  0547 04/01/17  0704 03/31/17  1922   LN-WHITE BLOOD CELL COUNT thou/uL 7.4 7.1 7.0   LN-HEMOGLOBIN g/dL 11.9* 12.0* 12.9*   LN-HEMATOCRIT % 37.6* 37.6* 39.3*   LN-PLATELET COUNT thou/uL 106* 97* 103*

## 2021-06-09 NOTE — PROGRESS NOTES
Progress Note    Brief summary:   76 y.o. old male with history of hypertension, dyslipidemia, diabetes mellitus, history of CAD, had presented emergency with complaint of chest pain, found to have an NSTEMI with elevated troponin, underwent coronary angiogram which showed multiple vessel disease.CABG postponed secondary to thrombocytopenia. Hematology suggested likely myelodysplasia or mild ITP and cleared him for CABG. He underwent CABG X 3 on 4/5/17 with Dr. Marquis.    Assessment/Plan  Principal Problem:    NSTEMI (non-ST elevated myocardial infarction)  Active Problems:    Hypertension    Dyslipidemia    CAD (coronary artery disease)    Type 2 diabetes mellitus    Thrombocytopenia    Coronary artery disease due to lipid rich plaque    Essential hypertension    S/P CABG x 3    #S/p CABG X 3 POD0: currently intubated, mx per CV[facility] and intensivist    #NSTEMI (non-ST elevated myocardial infarction); CAD (coronary artery disease);s/p cardiac cath : Severe multivessel disease, including  of RCA; s/p CABG X 3. On atenolol, lipitor, losartan     #Thrombocytopenia: cleared for CABG by hematology. ASA on hold. Heme recommends a unit of platelets just prior to procedure if needed and as needed afterwards.      #Essential Hypertension: controlled on Norvasc, Atenolol, Hyzaar     #Dyslipidemia: Continue Lipitor     #Type 2 diabetes mellitus: metformin on hold. Continue qid BG checks. Sugars well controlled without any treatment. A1c 3/28 was 6.0. Currently on insulin drip.     #SLOAN on home CPAP at night    Subjective  Patient seen and examined after surgery  Intubated and sedated    Objective    Vital signs in last 24 hours  Temp:  [96.1  F (35.6  C)-99.1  F (37.3  C)] 99.1  F (37.3  C)  Heart Rate:  [48-80] 79  Resp:  [16-22] 19  BP: (101-157)/(42-77) 101/59  Arterial Line BP: (107-128)/(51-60) 108/53  FiO2 (%):  [35 %-60 %] 35 %  Weight:   (!) 230 lb 14.4 oz (104.7 kg)    Intake/Output last 3 shifts  I/O last 3 completed  shifts:  In: 7600.7 [P.O.:650; I.V.:4861.7; Blood:1339; IV Piggyback:750]  Out: 2640 [Urine:1840; Blood:600; Chest Tube:200]  Intake/Output this shift:  I/O this shift:  In: -   Out: 215 [Urine:165; Chest Tube:50]    Physical Exam  General appearance: intubated   Eyes: conjunctivae/corneas clear. PERRL, EOM's intact. Fundi benign.  Lungs: clear to auscultation bilaterally  Heart: regular rate and rhythm, S1, S2 normal, no murmur, click, rub or gallop  Abdomen: soft, non-tender; bowel sounds normal; no masses, no organomegaly  Extremities: extremities normal, atraumatic, no cyanosis or edema      Meds    acetaminophen  650 mg Oral Q6H    Or     acetaminophen  650 mg Rectal Q6H     [START ON 4/6/2017] bisacodyl  10 mg Oral Once     [START ON 4/8/2017] bisacodyl  10 mg Rectal Once     ceFAZolin (ANCEF) IV  2 g Intravenous Q8H     [START ON 4/6/2017] docusate sodium  100 mg Oral BID     [START ON 4/7/2017] magnesium hydroxide  30 mL Oral DAILY     melatonin  3 mg Oral QHS     mupirocin  1 application Nasal BID     [START ON 4/6/2017] omeprazole  20 mg Oral Daily before brkfst     [START ON 4/6/2017] polyethylene glycol  17 g Oral DAILY       Pertinent Labs   Lab Results: personally reviewed.   not applicable      Results from last 7 days  Lab Units 04/05/17  1152 04/05/17  0435 04/04/17  1039  03/31/17  0828   LN-SODIUM mmol/L 140  --  139  --  139   LN-POTASSIUM mmol/L 4.5 4.3 4.1  < > 4.4   LN-CHLORIDE mmol/L 113*  --  104  --  105   LN-CO2 mmol/L 20*  --  26  --  23   LN-BLOOD UREA NITROGEN mg/dL 22  --  25  --  21   LN-CREATININE mg/dL 1.07  --  1.38*  --  1.29   LN-CALCIUM mg/dL 7.6*  --  9.6  --  9.5   < > = values in this interval not displayed.        Results from last 7 days  Lab Units 04/05/17  1152 04/05/17  0435 04/03/17  0547 04/01/17  0704   LN-WHITE BLOOD CELL COUNT thou/uL 13.2*  --  7.4 7.1   LN-HEMOGLOBIN g/dL 8.7* 11.9* 11.9* 12.0*   LN-HEMATOCRIT % 27.9*  --  37.6* 37.6*   LN-PLATELET COUNT thou/uL  138* 107* 106* 97*         Pertinent Radiology   Radiology Results: Personally reviewed impression/s    Xr Chest Ap Portable    Result Date: 4/5/2017  XR CHEST AP PORTABLE 4/5/2017 11:59 AM INDICATION: S/P CABG X 3 COMPARISON: 3/28/2017 FINDINGS: A new endotracheal tube tip terminates 4.5 cm from the francine. A right IJ sheath transmits a PA catheter which terminates in the main pulmonary artery. Subxiphoid drains are present. Left basilar opacities likely represent atelectasis, though a  small 1 mL pleural fluid or airspace disease cannot be excluded. There is mild edema. The cardiomediastinal silhouette is enlarged. Sternotomy suture wires are intact.    Xr Chest Pa And Lateral    Result Date: 3/28/2017  XR CHEST PA AND LATERAL 3/28/2017 4:06 PM INDICATION: preop CABG COMPARISON: 3/28/2017 at 0337 hours FINDINGS: No pleural fluid or pneumothorax. There are no focal airspace opacities. Heterogeneous likely calcified opacities in the midlungs are nonspecific though could represent nodules, pleural calcifications, or musculoskeletal opacities. Recommend comparison with any prior studies. If none are available, nonemergent chest CT is recommended. There is no significant edema. The cardiomediastinal silhouette is at the upper limits of normal size.    Us Carotid Bilateral    Result Date: 3/28/2017  US CAROTID BILATERAL 3/28/2017 6:19 PM INDICATION: CAD preop CABG TECHNIQUE: Duplex exam performed utilizing 2D gray-scale imaging, Doppler interrogation with color-flow and spectral waveform analysis. COMPARISON: None. FINDINGS: RIGHT: There is mild atheromatous plaque. Normal waveforms with no significant stenosis. LEFT: There is mild atheromatous plaque. Normal waveforms with no significant stenosis. There is abnormal waveform involving the right vertebral artery, with the appearance that of complete subclavian steal. The right subclavian artery waveform is normal. The left vertebral arteries and subclavian artery  waveforms are normal. VELOCITY CHART: The following velocities were obtained in the RIGHT carotid system. CCA: 99/12 cm/s ICA: 99/25 cm/s ECA: 118/16 cm/s ICA/CCA: PS 0.99 The following velocities were obtained in the LEFT carotid system. CCA: 101/20 cm/s ICA: 90/29 cm/s ECA: 130/17 cm/s ICA/CCA: PS 0.89                            CONCLUSION: 1.  Mild atheromatous plaque in the carotid arteries. 2.  No significant stenosis in the carotid arteries. 3.  Abnormal right vertebral artery waveform, with the appearance suggestive of complete subclavian steal. Evaluation based on velocities and NASCET criteria. NOTE: ABNORMAL REPORT THE DICTATION ABOVE DESCRIBES AN ABNORMALITY FOR WHICH FOLLOW-UP IS NEEDED.     Us Abdomen Limited    Result Date: 3/31/2017  US ABDOMEN LIMITED 3/31/2017 6:09 PM INDICATION: thrombocytopenia, to assess spleen size TECHNIQUE: Routine. COMPARISON: None. FINDINGS: The spleen is normal size measuring 9.8 x 4.8 x 3.6 cm. No splenic lesion seen.       EKG Results: personally reviewed.      DVT prophylaxis: ambulation  Diet: cardiac  Code Status: full     Advanced Care Planning:  Discharge Planning discussed with patient  Anticipated LOS: TBD  Barrier to discharge:post op recovery  Disposition:TBD  Discussed care with patient for >35 minutes with greater than 50% of time spent in counseling and coordination of care.       Delmy Diaz MD  Hospitalist  [phone]

## 2021-06-09 NOTE — PROGRESS NOTES
CV Surgery  CAB cancelled for today due to persistent thrombocytopenia despite platelets given last evening  Dr Marquis has discussed with the patient and his family  DC ASA now, will need to be off for 5 days  Heme consulted to evaluate  Tentatively rescheduled for next Wednesday    Mindy Willingham CNP  Pager [phone]

## 2021-06-09 NOTE — BRIEF OP NOTE
Brief Operative Note    Name:  Almas Gaona  Location: [facility] Main OR  Procedure Date:  4/5/2017  PCP:  David Mclean MD      CORONARY ARTERY BYPASS GRAFT X 3  ENDOSCOPIC VEIN HARVEST INTERNAL MAMMARY ARTERY ANESTHESIA TRANSESOPHAGEAL ECHOCARDIOGRAM (N/A)    Pre-Procedure Diagnosis:    Coronary artery disease [I25.10]     Post-Procedure Diagnosis:    same    Surgeon(s):  Laila Marquis MD    Findings:    Good targets and conduit    Estimated Blood Loss:   600 mL from 4/5/2017  6:50 AM to 4/5/2017 11:38 AM    Specimens:      ID Type Source Tests Collected by Time Destination   A : internal mammary lymph node Tissue Lymph Node SURGICAL PATHOLOGY EXAM Laila Marquis MD 4/5/2017 0820           Drains:   Chest Tube 1 36 Fr. (Active)       Chest Tube 2 36 Fr. (Active)       Chest Tube 3 36 Fr. (Active)       Urethral Catheter Non-latex;Temperature probe 16 Fr. (Active)       Implants:    Implant Name Type Inv. Item Serial No.  Lot No. LRB No. Used Action   LEAD PACING MYOCARDIAL TEMPORARY   6495F - [id number] Lead LEAD PACING MYOCARDIAL TEMPORARY   6495F  [employer] [id number] N/A 2 Implanted   PLEDGET PTFE SOFT 9X5           [id number] - [id number] IMPLANT MESH/GEN SURG PLEDGET PTFE SOFT 9X5           [id number]  AESCULAP [id number] N/A 1 Implanted   WIRE STERNAL SUTURE SINGLE #6    046-032 - [id number] 98-OTHER IMPLANTS WIRE STERNAL SUTURE SINGLE #6    046-032  A&E MED [id number] N/A 1 Implanted   WIRE STERNAL SUTURE DOUBLE #6    046-267 - [id number] 98-OTHER IMPLANTS WIRE STERNAL SUTURE DOUBLE #6    046-267  A&E MED [id number] N/A 1 Implanted   FELT MICHELLE PLEDGET 4.5 X 6       [id number] - [id number] CARDIO IMPLANTS FELT MICHELLE PLEDGET 4.5 X 6       [id number]  BARD [id number] N/A 1 Implanted   CLIP LIGATION HORIZON SMALL MULTI PK - [id number] - [id number] STAPLING CLIP LIGATION HORIZON SMALL MULTI PK - [id number]  WE [id number] N/A 1 Implanted   CLIP LIGATION HORIZON SMALL MULTI PK - [id number] - [id number] STAPLING CLIP LIGATION HORIZON SMALL  MULTI PK - [id number]  WE [id number] N/A 1 Implanted   CLIP LIGATION HORIZON MED MULTI     2204 - [id number] - [id number] STAPLING CLIP LIGATION HORIZON MED MULTI     2204 - [id number]  TELEFLEX [id number] N/A 1 Implanted   LIGACLIP LARGE         AESCULAP -7 - 51254-5 - [id number] STAPLING LIGACLIP LARGE         AESCULAP -9 - 90614-7   AESCULAP [id number] N/A 1 Implanted       Complications:  None    Laila Marquis     Date: 4/5/2017  Time: 12:04 PM

## 2021-06-09 NOTE — PROGRESS NOTES
[facility] Daily Progress Note      Date of Service: 3/30/2017     Assessment and plan    NSTEMI  ; Off heparin drip  ;cardiology on board  ;s/p cardiac cath : Severe multivessel disease, including  of RCA  ;CV surgery consulted   ;planned for CABG tomorrow    Severe CAD  ; Patient on high-dose Lipitor  ; Continue aspirin atenolol losartan  ;Check lipid panel : Triglyceride of 419  hemoglobin A1c 6    Mild elevated BNP  ; Does not appear in heart failure at present  ; Lungs clear, saturating well on room air  ; Check echo: EF of 55%  ; Discontinue IV fluid    Thrombocytopenia  ; Platelet decrease from 85-77  ; Check hit antibody      Essential hypertension  ; Continue home medication  ; Blood pressure appears controlled at present     History of dyslipidemia  ; High-dose Lipitor      History of diabetes mellitus  ; Hold metformin  ; Check hemoglobin A1c 6  ; Insulin scale coverage     History of sleep apnea  : CPAP at night     Body mass index is 37.17 kg/(m^2).        DVT Prophylaxis: SCDs    Subjective:   Patient states he feels fine, no chest pain or difficulty breathing    Little anxious from surgery tomorrow    Brief History: 76 y.o. old male with history of hypertension, dyslipidemia, diabetes mellitus, history of CAD, had presented emergency with complaint of chest pain, found to have an NSTEMI with elevated troponin, underwent coronary angiogram which showed multiple vessel disease and is planned for CABG.    Chart reviewed, events noted. Pt seen and examined.     Objective     Vital signs in last 24 hours:  Temp:  [97.4  F (36.3  C)-98.1  F (36.7  C)] 97.4  F (36.3  C)  Heart Rate:  [57-67] 58  Resp:  [16-20] 20  BP: (140-151)/(64-80) 151/76  Weight:   (!) 241 lb (109.3 kg)  Weight change: -3 lb 7 oz (-1.559 kg)  Body mass index is 36.64 kg/(m^2).    Intake/Output last 3 shifts:  I/O last 3 completed shifts:  In: 74 [I.V.:74]  Out: 300 [Urine:300]  Intake/Output this shift:         Physical Exam:  BP  "151/76 (Patient Position: Sitting)  Pulse (!) 58  Temp 97.4  F (36.3  C) (Oral)   Resp 20  Ht 5' 8\" (1.727 m)  Wt (!) 241 lb (109.3 kg)  SpO2 98%  BMI 36.64 kg/m2    FiO2 (%): 21 % O2 Device: None (Room air) O2 Flow Rate (L/min): 2 L/min    General Appearance:    Alert, no apparent distress.    HEENT:    Normocephalic. Pupils equal and reactive. No scleral   Icterus. Moist oral mucosa    Lungs:     Clear to auscultation bilaterally, respirations unlabored   Heart::    Regular rate and rhythm, S1 and S2 normal, no murmur, rub   or gallop.    peripheral edema.   Abdomen:   Soft, Non tender. Non distended. Bowel sounds present.    Extremity :   No deformity   Pulses:   2+ and symmetric all extremities   CNS:   Alert and oriented, no facial asymmetry  No focal neurologic deficits         Imaging:  personally reviewed.      Scheduled Meds:    amLODIPine  5 mg Oral DAILY     aspirin  81 mg Oral DAILY     atenolol  25 mg Oral DAILY     atorvastatin  80 mg Oral DAILY     latanoprost  1 drop Both Eyes QHS     losartan-hydrochlorothiazide (HYZAAR) 100-12.5 mg combo dose (INP)   Oral DAILY     mupirocin  1 application Nasal BID     niacin  250 mg Oral BID with meals     sodium chloride  3 mL Intravenous Line Care     tamsulosin  0.4 mg Oral Daily after supper     Continuous Infusions:    nacl 0.9%       PRN Meds:.acetaminophen, dextrose 50 % (D50W), glucagon (human recombinant), lidocaine, morphine  injection, naloxone **OR** naloxone, nitroglycerin, oxyCODONE    Lab Results:  personally reviewed.   not applicable  Recent Results (from the past 24 hour(s))   Urinalysis-UC if Indicated    Collection Time: 03/29/17  1:10 PM   Result Value Ref Range    Color, UA Straw Colorless, Yellow, Straw, Light Yellow    Clarity, UA Clear Clear    Glucose, UA Negative Negative    Bilirubin, UA Negative Negative    Ketones, UA Negative Negative    Specific Gravity, UA 1.009 1.001 - 1.030    Blood, UA Negative Negative    pH, UA 6.0 4.5 " - 8.0    Protein,  mg/dL (!) Negative mg/dL    Urobilinogen, UA <2.0 E.U./dL <2.0 E.U./dL, 2.0 E.U./dL    Nitrite, UA Negative Negative    Leukocytes, UA Negative Negative    Bacteria, UA Moderate (!) None Seen hpf    RBC, UA 0-2 None Seen, 0-2 hpf    WBC, UA 0-5 None Seen, 0-5 hpf    Squam Epithel, UA 0-5 None Seen, 0-5 lpf   Culture, Urine    Collection Time: 03/29/17  1:10 PM   Result Value Ref Range    Culture No Growth    POCT Glucose    Collection Time: 03/29/17  5:03 PM   Result Value Ref Range    Glucose, POC 98 mg/dL   HM2(CBC w/o Differential)    Collection Time: 03/30/17  6:17 AM   Result Value Ref Range    WBC 6.5 4.0 - 11.0 thou/uL    RBC 4.22 (L) 4.40 - 6.20 mill/uL    Hemoglobin 12.3 (L) 14.0 - 18.0 g/dL    Hematocrit 39.1 (L) 40.0 - 54.0 %    MCV 93 80 - 100 fL    MCH 29.1 27.0 - 34.0 pg    MCHC 31.5 (L) 32.0 - 36.0 g/dL    RDW 13.8 11.0 - 14.5 %    Platelets 77 (L) 140 - 440 thou/uL    MPV 14.3 (H) 8.5 - 12.5 fL   Basic Metabolic Panel    Collection Time: 03/30/17  6:17 AM   Result Value Ref Range    Sodium 137 136 - 145 mmol/L    Potassium 4.5 3.5 - 5.0 mmol/L    Chloride 105 98 - 107 mmol/L    CO2 22 22 - 31 mmol/L    Anion Gap, Calculation 10 5 - 18 mmol/L    Glucose 123 70 - 125 mg/dL    Calcium 9.3 8.5 - 10.5 mg/dL    BUN 23 8 - 28 mg/dL    Creatinine 1.23 0.70 - 1.30 mg/dL    GFR MDRD Af Amer >60 >60 mL/min/1.73m2    GFR MDRD Non Af Amer 57 (L) >60 mL/min/1.73m2   Magnesium    Collection Time: 03/30/17  6:17 AM   Result Value Ref Range    Magnesium 3.4 (H) 1.8 - 2.6 mg/dL   Type and Screen    Collection Time: 03/30/17  6:17 AM   Result Value Ref Range    ABORh AB POS     Antibody Screen Negative Negative   POCT Glucose    Collection Time: 03/30/17  7:47 AM   Result Value Ref Range    Glucose,  mg/dL   ECG 12 lead MUSE    Collection Time: 03/30/17  8:13 AM   Result Value Ref Range    SYSTOLIC BLOOD PRESSURE  mmHg    DIASTOLIC BLOOD PRESSURE  mmHg    VENTRICULAR RATE 61 BPM     ATRIAL RATE 61 BPM    P-R INTERVAL 196 ms    QRS DURATION 100 ms    Q-T INTERVAL 410 ms    QTC CALCULATION (BEZET) 412 ms    P Axis 44 degrees    R AXIS -20 degrees    T AXIS 7 degrees    MUSE DIAGNOSIS       Sinus rhythm with Possible Premature atrial complexes with Aberrant conduction  Cannot rule out Inferior infarct , age undetermined  Abnormal ECG  No previous ECGs available     Crossmatch    Collection Time: 03/30/17 11:37 AM   Result Value Ref Range    Crossmatch Compatible     Blood Expiration Date [id number]     Unit Type AB Pos     Unit Number [id number]     Status Ready     Component Red Blood Cells     PRODUCT CODE B4694G83     Blood Type 8400     CODING SYSTEM [id number]    Crossmatch    Collection Time: 03/30/17 11:37 AM   Result Value Ref Range    Crossmatch Compatible     Blood Expiration Date [id number]     Unit Type AB Pos     Unit Number [id number]     Status Ready     Component Red Blood Cells     PRODUCT CODE [id number]     Blood Type 8400     CODING SYSTEM [id number]    POCT Glucose    Collection Time: 03/30/17 11:56 AM   Result Value Ref Range    Glucose,  mg/dL       Results from last 7 days  Lab Units 03/30/17  1156 03/30/17  0747 03/29/17  1703 03/29/17  1207 03/29/17  0654 03/28/17  2347 03/28/17  1703 03/28/17  1211   LN-POC GLUCOSE FINGERSTICK- HE mg/dL 121 124 98 96 116 88 113 95           Advance Care Planning:   Barriers to discharge: Awaiting CABG  Anticipated discharge day: 2-3 days  Disposition: Likely home      Yarelis Sears MD  [facility]  Hospitalist

## 2021-06-09 NOTE — ANESTHESIA PROCEDURE NOTES
Arterial Line  Reason for Procedure: hemodynamic monitoring and multiple ABGs  Patient location during procedure: OR pre-induction  Start time: 4/5/2017 7:04 AM  End time: 4/5/2017 7:09 AM  Staffing:  Performing  Anesthesiologist: SANDRA MONTE  Performing CRNA: SANDRA PRESLEY  Sterile Precautions:  sterile barriers used during insertion: cap, mask, sterile gloves, large sheet, and hand hygiene used.  Arterial Line:   Immediately prior to procedure a time out was called to verify the correct patient, procedure, equipment, support staff and site/side marked as required  Laterality: left  Location: brachial  Prepped with: ChloroPrep    Needle gauge: 20 G  Number of Attempts: 1  Secured with: tape, transparent dressing and pressure dressing (suture)  Flushed with: saline  1% lidocaine local anesthesia used for skin prep.   See MAR for additional medications given.  Ultrasound evaluation of access site: yes  Vessel patent by US exam    Concurrent real time visualization of needle entry

## 2021-06-09 NOTE — PROGRESS NOTES
RESPIRATORY CARE NOTE     Patient Name: Almas Gaona  Today's Date: 3/30/2017     Met with patient for pre-op heart teaching.  He states he has no diagnosis of asthma or COPD, he quit smoking in 1991.  He does have SLOAN and uses a CPAP machine at home.  Currently he is using one of our machines here. We talked about how to use the heart pillow for coughing and protecting his incision post-op. Instructed him on incentive spirometry.  Patient is able to achieve 2000 ml which is slightly lower than his predicted of 2300ml.  He had no questions at this time for me.  I let him know that what we covered is also in the handbook of information that he has and to feel free to ask us questions any time during his stay.     Georgie Mccain, RANJANT

## 2021-06-09 NOTE — ANESTHESIA PROCEDURE NOTES
Central line    Start time: 4/5/2017 7:21 AM  End time: 4/5/2017 7:38 AM  Patient location: OR Post-induction  Indications: central pressure monitoring and vascular access  Performing Anesthesiologist: SANDRA MONTE  Pre-procedure Checklist  Completed: patient identified, site marked, risks, benefits, and alternatives discussed, timeout performed, consent obtained, hand hygiene performed, all elements of maximal sterile barriers used including cap, mask, gown, sterile gloves, and large sheet and skin prep agent completely dried prior to procedure    Procedure Details:  Preparation: 2% chlorhexidine  Location details: right internal jugular  Site selection rationale: cardiac surgery  Catheter type: Introducer with [place]-Ree  Introducer type: MAC  Lumens:double lumenWedged at: 59   Withdrawn and locked at: 53Number of attempts: 1  Ultrasound evaluation of access site: yes  Vessel patent by US exam  Concurrent real time visualization of needle entryTransduced for venous waveform  manometry confirmation of venous access    Post-procedure:   line sutured and Antimicrobial disks with CHG applied  Assessment: blood return through all ports and free fluid flow  Complications: none

## 2021-06-09 NOTE — CONSULTS
[facility] Hematology and Oncology Inpatient Consult Note    Patient: Almas Gaona  MRN: [id number]  Date of Service: 3/31/2017      Reason for Visit    I was consulted by Mindy Willingham CNP regarding thrombocytopenia    Assessment/Plan    A 76-year-old male with thrombocytopenia of undetermined duration. Noted elevated MPV.      I reviewed the etiology of thrombocytopenia in the acute versus chronic presentation.  There is no clinical or laboratory evidence of TTP, DIC or HIT.  There is no evidence of chronic liver disease.  He does not have an acute bacterial infection or viral infection.  He does not know about his chronic viral studies.  No evidence of renal failure, hypo-calcium or hypoproteinemia acute less likely of multiple myeloma.  His WBC count has been in normal range although I do not have differentials.  This could possibly be thrombocytopenia from his acute illness.  Immune related thrombocytopenia is also considered.  I will also look in to the peripheral smear to look for any evidence of dysmorphic features or concerning features of primary bone marrow disease.  I explained to the patient that I would like to request this.  His lab work completed in his primary clinic to determine the chronicity of the thrombocytopenia.   I explained to the patient that further diagnostic investigation can be completed by bone marrow biopsy however I do not see immediate indication at this point.  We will start with the following blood work and ultrasound of the spleen.  In the meantime I will continue to monitor his platelet count.    Surgical bleeding may be a concern if platelet count less than 100 K in major cardiac surgery like CABG, also optimally like to have his platelet count greater than 100K for cardiac surgery.     - Requested following labs to evaluate for thrombocytopenia: retic count, HM-1, morphology of blood smear, hepatitis C, HIV, vitamin B12, folate, copper.  - US spleen.  - Requested  medical records (labs) from [facility].  - We will determine the strategies to optimize his platelet count for upcoming cardiac surgery once we have more information.  ______________________________________________________________________________    History  Mr. Almas Gaona is a 76 y.o. male who is currently in hospital after an non-STEMI.  Coronary angiogram showed severe multivessel disease, therefore the decision was made to proceed with coronary artery bypass grafting.  The surgery was planned for today however he was noted to have thrombocytopenia of 70-80 K in the last 2-3 days without prior values to compare.  He was given prophylactic platelet transfusion yesterday in preparation of surgery today.  His platelet count today was 91 daily.  Surgery was canceled due to thrombocytopenia. HIT was sent and it was negative.    Upon review of his medication, he was on aspirin until this morning.  He was started on heparin on 3/28/2017 with presentation with acute and STEMI.  He denies taking any additional medication or herbal supplementation apart from listed here.    During the encounter encounter, he is overall feeling well.  No bleeding.  He has been up on walking.  He did not aware of previous history of thrombocytopenia or any blood disease.  He reported that he had blood work done periodically at his regular clinic in his nephrology clinic.  He denies any fever, night sweats, or bone pain.  His appetite has been good.    Review of Systems    Apart from describing in history, the remainder of comprehensive ROS was negative.    Past History  Hypertension  Hyperlipidemia  Diabetes-2  Obstructive sleep apnea  Coronary artery disease-status post stent placement in circumflex vessel in 1991    Social history  Social History     Social History     Marital status:      Spouse name: N/A     Number of children: N/A     Years of education: N/A     Occupational History     Not on file.     Social  History Main Topics     Smoking status: Former Smoker     Packs/day: 1.00     Years: 30.00     Types: Cigarettes     Start date: 3/28/1961     Quit date: 3/28/1991     Smokeless tobacco: Not on file     Alcohol use No      Comment: occasional drinks     Drug use: No     Sexual activity: Not on file     Other Topics Concern     Not on file     Social History Narrative       Family history  Mother was diagnosed with acute leukemia at age 95 and  from it within 1 week.    Medications  Reviewed.    Allergies    No Known Allergies     Physical Exam    Recent Vitals 3/31/2017   Height -   Weight -   BSA (m2) -   /66   Pulse 67   Temp 98.1   Temp src 1   SpO2 97     General: alert, awake, not in acute distress. obese male  HEENT: Head: Normal, normocephalic, atraumatic.  Eye: Normal external eye, conjunctiva, lids cornea, PATITO.  Ears: Normal TM's bilaterally. Normal auditory canals and external ears. Non-tender.  Nose: Normal external nose, mucus membranes and septum.  Pharynx: Dental Hygiene adequate. Normal buccal mucosa. Normal pharynx.  Neck / Thyroid: Supple, no masses, nodes, nodules or enlargement. Fullness in the supraclavicular area.  Lymphatics: No abnormally enlarged lymph nodes.  Chest: Normal chest wall and respirations. Clear to auscultation.  Heart: S1 S2 RRR, no murmur.   Abdomen: abdomen is soft without significant tenderness, masses, organomegaly or guarding  Extremities: normal strength, tone, and muscle mass  Skin: normal. no rash or abnormalities  CNS: non focal.      Lab Results    Results for orders placed or performed during the hospital encounter of 17   Comprehensive Metabolic Panel   Result Value Ref Range    Sodium 139 136 - 145 mmol/L    Potassium 4.4 3.5 - 5.0 mmol/L    Chloride 105 98 - 107 mmol/L    CO2 23 22 - 31 mmol/L    Anion Gap, Calculation 11 5 - 18 mmol/L    Glucose 119 70 - 125 mg/dL    BUN 21 8 - 28 mg/dL    Creatinine 1.29 0.70 - 1.30 mg/dL    GFR MDRD Af Amer >60  >60 mL/min/1.73m2    GFR MDRD Non Af Amer 54 (L) >60 mL/min/1.73m2    Bilirubin, Total 0.4 0.0 - 1.0 mg/dL    Calcium 9.5 8.5 - 10.5 mg/dL    Protein, Total 7.6 6.0 - 8.0 g/dL    Albumin 3.5 3.5 - 5.0 g/dL    Alkaline Phosphatase 80 45 - 120 U/L    AST 33 0 - 40 U/L    ALT 28 0 - 45 U/L     No results found for this or any previous visit.     Imaging Results    Xr Chest Pa And Lateral    Result Date: 3/28/2017  XR CHEST PA AND LATERAL 3/28/2017 4:06 PM INDICATION: preop CABG COMPARISON: 3/28/2017 at 0337 hours FINDINGS: No pleural fluid or pneumothorax. There are no focal airspace opacities. Heterogeneous likely calcified opacities in the midlungs are nonspecific though could represent nodules, pleural calcifications, or musculoskeletal opacities. Recommend comparison with any prior studies. If none are available, nonemergent chest CT is recommended. There is no significant edema. The cardiomediastinal silhouette is at the upper limits of normal size.    Us Carotid Bilateral    Result Date: 3/28/2017  US CAROTID BILATERAL 3/28/2017 6:19 PM INDICATION: CAD preop CABG TECHNIQUE: Duplex exam performed utilizing 2D gray-scale imaging, Doppler interrogation with color-flow and spectral waveform analysis. COMPARISON: None. FINDINGS: RIGHT: There is mild atheromatous plaque. Normal waveforms with no significant stenosis. LEFT: There is mild atheromatous plaque. Normal waveforms with no significant stenosis. There is abnormal waveform involving the right vertebral artery, with the appearance that of complete subclavian steal. The right subclavian artery waveform is normal. The left vertebral arteries and subclavian artery waveforms are normal. VELOCITY CHART: The following velocities were obtained in the RIGHT carotid system. CCA: 99/12 cm/s ICA: 99/25 cm/s ECA: 118/16 cm/s ICA/CCA: PS 0.99 The following velocities were obtained in the LEFT carotid system. CCA: 101/20 cm/s ICA: 90/29 cm/s ECA: 130/17 cm/s ICA/CCA: PS 0.89                             CONCLUSION: 1.  Mild atheromatous plaque in the carotid arteries. 2.  No significant stenosis in the carotid arteries. 3.  Abnormal right vertebral artery waveform, with the appearance suggestive of complete subclavian steal. Evaluation based on velocities and NASCET criteria. NOTE: ABNORMAL REPORT THE DICTATION ABOVE DESCRIBES AN ABNORMALITY FOR WHICH FOLLOW-UP IS NEEDED.         Signed by: Jarad Fragoso MD

## 2021-06-09 NOTE — PROGRESS NOTES
Progress Note    Assessment/Plan  Status multivessel CAD  Denies chest pain and or shortness of breath  Carotid US showed no significant stenosis  Plan is urgent CABG on Friday 03/31/2017    Vivek Ryan

## 2021-06-09 NOTE — PROGRESS NOTES
[place] Daily Progress Note      Date of Service: 4/2/2017     Assessment and plan    NSTEMI  ; Off heparin drip  ;cardiology on board  ;s/p cardiac cath : Severe multivessel disease, including  of RCA  ;CV surgery consulted   ;planned for CABG : was canceled secondary to thrombocytopenia   ; Awaiting hematology clearance    Thrombocytopenia, INR normal   ;85 to 77 to 91 to 97   ;hemotology has been consulted   ;taken off aspirin   ; Workup as per hematology  ; Care everywhere reviewed: Patient seemed to have low platelets in the past: Platelet was 65 in 5/2/2016  ; Patient had multiple myeloma workup in 5/2/2016 which was 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  : Hematologic follow-up  ; Recheck platelets tomorrow    Severe CAD  ; Patient on high-dose Lipitor  ; Continue atenolol losartan  ;Check lipid panel : Triglyceride of 419  hemoglobin A1c 6    Mild elevated BNP  ; Does not appear in heart failure at present  ; Lungs clear, saturating well on room air  ; Check echo: EF of 55%      Essential hypertension  ; Continue home medication  ; Blood pressure appears controlled at present     History of dyslipidemia  ; High-dose Lipitor      History of diabetes mellitus  ; Hold metformin  ; Check hemoglobin A1c 6  ; Insulin scale coverage     History of sleep apnea  : CPAP at night     Body mass index is 37.17 kg/(m^2).        DVT Prophylaxis: SCDs    Subjective:   Patient states he feels fine  He feels bored and claustrophobic from staying in the hospital  Patient taking long walks  No chest pain or difficulty breathing or ambulate      Brief History: 76 y.o. old male with history of hypertension, dyslipidemia, diabetes mellitus, history of CAD, had presented emergency with complaint of chest pain, found to have an NSTEMI with elevated troponin, underwent coronary angiogram which showed multiple vessel  "disease and is planned for CABG.  CABG postponed secondary to thrombocytopenia    Chart reviewed, events noted. Pt seen and examined.     Objective     Vital signs in last 24 hours:  Temp:  [97.5  F (36.4  C)-98.6  F (37  C)] 97.8  F (36.6  C)  Heart Rate:  [58-71] 60  Resp:  [16-20] 20  BP: (123-141)/(68-78) 141/68  Weight:   (!) 232 lb 6.4 oz (105.4 kg)  Weight change: -8 oz (-0.227 kg)  Body mass index is 35.34 kg/(m^2).    Intake/Output last 3 shifts:  I/O last 3 completed shifts:  In: 360 [P.O.:360]  Out: -   Intake/Output this shift:         Physical Exam:  /68 (Patient Position: Sitting)  Pulse 60  Temp 97.8  F (36.6  C) (Oral)   Resp 20  Ht 5' 8\" (1.727 m)  Wt (!) 232 lb 6.4 oz (105.4 kg)  SpO2 98%  BMI 35.34 kg/m2    FiO2 (%): 21 % O2 Device: None (Room air) O2 Flow Rate (L/min): 2 L/min    General Appearance:    Alert, no apparent distress.    HEENT:    Normocephalic. Pupils equal and reactive. No scleral   Icterus.    Lungs:     Clear to auscultation bilaterally, respirations unlabored   Heart::    Regular rate and rhythm, S1 and S2 normal, no murmur, rub   or gallop.    peripheral edema.   Abdomen:   Soft, Non tender. Non distended. Bowel sounds present.    Extremity :   No deformity   Pulses:   2+ and symmetric all extremities   CNS:   Alert and oriented, no facial asymmetry  No focal neurologic deficits         Imaging:  personally reviewed.      Scheduled Meds:    amLODIPine  5 mg Oral DAILY     atenolol  25 mg Oral DAILY     atorvastatin  80 mg Oral DAILY     latanoprost  1 drop Both Eyes QHS     losartan-hydrochlorothiazide (HYZAAR) 100-12.5 mg combo dose (INP)   Oral DAILY     mupirocin  1 application Nasal BID     sodium chloride  3 mL Intravenous Line Care     tamsulosin  0.4 mg Oral Daily after supper     Continuous Infusions:    nacl 0.9% 25 mL/hr (03/31/17 0600)     PRN Meds:.acetaminophen, dextrose 50 % (D50W), docusate sodium, glucagon (human recombinant), lidocaine, morphine  " injection, naloxone **OR** naloxone, nitroglycerin, oxyCODONE    Lab Results:  personally reviewed.   not applicable  Recent Results (from the past 24 hour(s))   Platelet Product Information    Collection Time: 04/01/17  2:19 PM   Result Value Ref Range    Status Canceled     Component Platelets    POCT Glucose    Collection Time: 04/01/17  5:19 PM   Result Value Ref Range    Glucose, POC 93 mg/dL   POCT Glucose    Collection Time: 04/01/17  9:08 PM   Result Value Ref Range    Glucose,  mg/dL   POCT Glucose    Collection Time: 04/02/17  4:03 AM   Result Value Ref Range    Glucose,  mg/dL   Magnesium    Collection Time: 04/02/17  6:10 AM   Result Value Ref Range    Magnesium 1.9 1.8 - 2.6 mg/dL   Potassium - Next AM    Collection Time: 04/02/17  6:10 AM   Result Value Ref Range    Potassium 4.5 3.5 - 5.0 mmol/L   POCT Glucose    Collection Time: 04/02/17  7:42 AM   Result Value Ref Range    Glucose,  mg/dL   POCT Glucose    Collection Time: 04/02/17 11:50 AM   Result Value Ref Range    Glucose,  mg/dL       Results from last 7 days  Lab Units 04/02/17  1150 04/02/17  0742 04/02/17  0403 04/01/17  2108 04/01/17  1719 04/01/17  1144 04/01/17  0746 03/31/17  1728 03/31/17  1153 03/30/17  1751   LN-POC GLUCOSE FINGERSTICK- HE mg/dL 131 104 102 155 93 139 106 94 122 87           Advance Care Planning:   Barriers to discharge: Awaiting CABG  Anticipated discharge day: 2-3 days  Disposition: Likely home      Yarelis Sears MD  [facility]  Hospitalist

## 2021-06-09 NOTE — PROGRESS NOTES
"Cardiology Progress Note    Assessment:    Non-ST segment elevation myocardial infarction, hemodynamically stable, pain-free   Coronary artery disease with remote history of circumflex stenting, three-vessel involvement including total occlusion of RCA  Hypertension, good control  Diabetes mellitus  Thrombocytopenia    Plan:  CABG was postponed due to thrombocytopenia.  Hematology consult pending    Subjective:   Denies recurrent chest pain or shortness of breath    Objective:   /70 (Patient Position: Sitting)  Pulse (!) 58  Temp 97.7  F (36.5  C) (Oral)   Resp 16  Ht 5' 8\" (1.727 m)  Wt (!) 232 lb 4.8 oz (105.4 kg)  SpO2 97%  BMI 35.32 kg/m2    Intake/Output Summary (Last 24 hours) at 03/31/17 1301  Last data filed at 03/31/17 0600   Gross per 24 hour   Intake             1235 ml   Output                0 ml   Net             1235 ml         Physical Exam:  GENERAL: no distress  NECK: No JVD  LUNGS: Clear to auscultation.  CARDIAC: regular  rhythm, S1 & S2 normal.  No heaves, thrills, gallops or murmurs.  ABDOMEN: flat, negative hepatosplenomegaly, soft and non-tender.  EXTREMITIES: No evidence of cyanosis, clubbing or edema.    Current Facility-Administered Medications   Medication Dose Route Frequency Provider Last Rate Last Dose     acetaminophen tablet 500-1,000 mg (TYLENOL)  500-1,000 mg Oral Q4H PRN Sophy Quick MD         amLODIPine tablet 5 mg (NORVASC)  5 mg Oral DAILY TARAN Steel   5 mg at 03/31/17 0845     atenolol tablet 25 mg (TENORMIN)  25 mg Oral DAILY TARAN Steel   25 mg at 03/31/17 0845     atorvastatin tablet 80 mg (LIPITOR)  80 mg Oral DAILY Sophy Quick MD   80 mg at 03/31/17 0846     dextrose 50 % (D50W) syringe 20-50 mL  20-50 mL Intravenous PRN TARAN Steel         glucagon (human recombinant) injection 1 mg  1 mg Subcutaneous PRN TARAN Steel         latanoprost 0.005 % ophthalmic solution 1 drop (XALATAN)  1 drop Both Eyes QHS " TARAN Steel   1 drop at 03/30/17 2035     lidocaine 10 mg/mL (1 %) injection 0.1-0.3 mL  0.1-0.3 mL Subcutaneous PRN Vivek Ryan PA-C         losartan-hydrochlorothiazide (HYZAAR) 100-12.5 mg combo dose (INP)   Oral DAILY TARAN Steel         morphine injection 1-2 mg  1-2 mg Intravenous Q3H PRN Sophy Quick MD         mupirocin 2 % ointment 1 application (BACTROBAN)  1 application Nasal BID Vivek Ryan PA-C   1 application at 03/31/17 0847     naloxone injection 0.2-0.4 mg (NARCAN)  0.2-0.4 mg Intravenous PRN Rudi Arzate (Ulisses) MD Robert        Or     naloxone injection 0.2-0.4 mg (NARCAN)  0.2-0.4 mg Intramuscular PRN Rudi Arzate (Ulisses) MD Robert         niacin tablet 250 mg  250 mg Oral BID with meals IGNACIO SteelBS   250 mg at 03/30/17 1649     nitroglycerin SL tablet 0.4 mg (NITROSTAT)  0.4 mg Sublingual Q5 Min PRN TARAN Steel         oxyCODONE immediate release tablet 5-10 mg (ROXICODONE)  5-10 mg Oral Q4H PRN Sophy Quick MD         sodium chloride 0.9 % flush 3 mL (NS)  3 mL Intravenous Line Care Vivek Ryan PA-C   3 mL at 03/31/17 0100     sodium chloride 0.9%  25 mL/hr Intravenous Continuous MARINA Couch-C 25 mL/hr at 03/31/17 0600 25 mL/hr at 03/31/17 0600     tamsulosin 24 hr capsule 0.4 mg (FLOMAX)  0.4 mg Oral Daily after supper KevinIGNACIO BlountBS   0.4 mg at 03/30/17 1832       Cardiographics:    Telemetry: Normal sinus rhythm    Echocardiogram:    Left ventricular systolic function appears lower limits of normal estimated visual ejection fraction of approximately 55%.    The following segments are akinetic: basal inferior and mid inferoseptal. The following segments are hypokinetic: basal inferolateral and mid inferior    Left Atrium: Left atrial volume is mildly increased    No observed significant valve abnormality    Pericardium: Trace pericardial effusion    Coronary angio:    Severe, focal LAD and  circumflex disease with a calcified chronic occlusion of the proximal and mid RCA with good collateral flow to the distal RCA system  Lab Results:     Results from last 7 days  Lab Units 03/31/17  0828   LN-SODIUM mmol/L 139   LN-POTASSIUM mmol/L 4.4   LN-CHLORIDE mmol/L 105   LN-CO2 mmol/L 23   LN-BLOOD UREA NITROGEN mg/dL 21   LN-CREATININE mg/dL 1.29   LN-CALCIUM mg/dL 9.5       Results from last 7 days  Lab Units 03/31/17  0403 03/30/17  0617 03/28/17  0944   LN-WHITE BLOOD CELL COUNT thou/uL 6.9 6.5 8.3   LN-HEMOGLOBIN g/dL 11.5* 12.3* 12.1*   LN-HEMATOCRIT % 35.7* 39.1* 38.0*   LN-PLATELET COUNT thou/uL 91* 77* 85*     No results found for: BNP  Lab Results   Component Value Date    INR 0.96 03/29/2017    INR 0.94 03/28/2017     Lab Results   Component Value Date    TROPONINI 6.19 () 03/28/2017    TROPONINI 6.20 () 03/28/2017    TROPONINI 2.11 () 03/28/2017       Rudi (Ulisses)  MD Robert

## 2021-06-09 NOTE — PROGRESS NOTES
Clinical Nutrition Therapy    Pt not ready for diet education today. Pt reports tried to eat eggs for bkfst, took 1 bite only.  Took ice tea for lunch, wanted fruit but told couldn't.    Diet order was entered into epic as no carb, SCIP. As surgery was 4/5,. Altered diet to ADA cardiac.    Pt has poor appetite post surgery 2nd day.    PES: knowledge deficit r/t new limits/review limits AEB CV Surgery    Intervention: add GC boost to aid with protein and calorie intake until appetite returns.  Goal:to meet est needs of 1600 calories and 70 to 85 grams of protein.  Plan: monitor for readiness for education.

## 2021-06-09 NOTE — PROGRESS NOTES
Progress Note          SUBJECTIVE :     No cp  No fever  Creatinine trending up    PLAN :     - stop iv lasix, lisinopril : due to elevated creatinine    - wean off oxygen    - not yet medically ready for discharge.          Barriers to Discharge : elevated creatinine, stable HR  Anticipated discharge : TBD, 2 days ?  Disposition : home with family      TIME SPENT : Total time spent > 35 minutes and > 50% time spent on counseling patient about plan of care and coordination of care.           BRIEF HOSPITAL COURSE :               ASSESSMENT :          1. S/p CABG X 3    2. Post op acute hypoxic respiratory failure : on 2 liters of oxygen    3. Post op Pulmonary congestion : on lasix    4. Post op a fib : on amio infusion    5. Acute on CKD stage 3 : stopped iv lasix, hold lisinopril, monitor.    6. NSTEMI (non-ST elevated myocardial infarction); CAD (coronary artery disease);s/p cardiac cath : Severe multivessel disease, including  of RCA; s/p CABG X 3. On atenolol, lipitor, losartan    7. Anemia, chronic with acute blood loss    8. Thrombocytopenia: cleared for CABG by hematology.     9. Essential Hypertension: controlled on Norvasc, Atenolol, Hyzaar    10. Chronic Medical Issues : Dyslipidemia: Continue Lipitor    11. Type 2 diabetes mellitus: metformin on hold. Continue qid BG checks. Sugars well controlled without any treatment. A1c 3/28 was 6.0. Was on insulin drip, now switched to lantus      12. SLOAN on home CPAP at night        Consults and Recommendations :            Diet :  Diabetic - cardiac   , IV fluids :  none   , IV Meds :    , Antibiotics     QTc :  DVT Prophylaxis : heparin  Code Status : Full  Admit status : inpatient  Orders reviewed    Main Concerning Issues :               Objective :            Review of Systems   A 12 point comprehensive review of systems was negative except as noted.        Physical Exam    HEENT : no distended veins, no lymphadenopathy, thyroid is normal  LUNGS : b/l air  entry present, no significant crackles/wheezing.  ABDOMEN : soft, non-tender, non-distended, BS present.  HEART :  Regular rate & rhythm, S1 & S2 normal, no murmur, clicks/rubs, no ankle edema  NEURO : conscious, oriented, responds to commands, no obvious focal deficit.  MUSCULOSKELETAL / EXTREMITIES :  no calf tenderness.  SKIN : no rashes  BACK : wnl        Pertinent Labs   Lab Results: personally reviewed.   Lab Results   Component Value Date     04/08/2017    K 4.6 04/08/2017     04/08/2017    CO2 20 (L) 04/08/2017    BUN 43 (H) 04/08/2017    CREATININE 1.87 (H) 04/08/2017    CALCIUM 8.0 (L) 04/08/2017           Pertinent Radiology   Radiology Results: Personally reviewed image/s  EKG Results: not done              DR. ADELAIDE ISBELL  [facility] HospitalIST

## 2021-06-09 NOTE — PROGRESS NOTES
Chart Check:    No changes from yesterday.  Awaiting full heme consult.  No acute chest pains.  CT surgery once hematology workup allows.    Chaparrita Schroeder MD

## 2021-06-10 NOTE — PROGRESS NOTES
Clinical Nutrition Therapy  Follow Up Note    Pt is receiving ADA Cardiac diet and is eating 100% at all three meals.    [place] 17  BM 4/10  Weight 236 3/4# 4/12  Labs reviewed.    Pt is at low nutrition risk. Education 4/10.    Intervention: discontinued supplements as appetite is good.  Goal: pt to continue to meet est needs.  Plan: monitor intake

## 2021-06-10 NOTE — PROGRESS NOTES
"Progress Note  CV Surg   POD # 6  Hgb a1c 6.0    Subjective  \"I don't know, I guess okay\"  \"I can't go home like this\"    Objective  Resp status improved today  In rehab, walking back to room  Pt. Feels TCU will be best option for him upon discharge    Vital signs in last 24 hours  /72 (Patient Position: Sitting)  Pulse 70  Temp 98.3  F (36.8  C) (Oral)   Resp 18  Ht 5' 8\" (1.727 m)  Wt (!) 238 lb 8 oz (108.2 kg)  SpO2 97%  BMI 36.26 kg/m2  O2 Sat's on RA 96%    Weight:   (!) 238 lb 8 oz (108.2 kg)  Yesterday 109.7  kg  Admit 110.9  kg      Intake/Output this shift:  Incomplete documentation  Stool LBM + very small,    Ambulation: 130-60 ft    Physical Exam  Neuro: no gross neuro deficits  Lungs: crackles noted L base  Cor: SR with occ PVC  INC: healing  ABD: large round, BS+, +flatus. + small BM  EXT: edema with sock indentations, pitting edema     CXR:Removal of prior drains and tubes. Improved but persistent small pleural effusions. No pneumothorax. Stable cardiomediastinal silhouette enlargement. Sternotomy. Calcified pleural plaques. Mild basilar atelectasis. Apparent small air-fluid level over the right of midline thorax projecting near the third from the top sternotomy wire. This should be followed to exclude small collection.    Pertinent Labs   Lab Results: personally reviewed.   Lab Results   Component Value Date     04/11/2017    K 3.9 04/11/2017     04/11/2017    CO2 23 04/11/2017    BUN 52 (H) 04/11/2017    CREATININE 1.60 (H) 04/11/2017    CALCIUM 7.8 (L) 04/11/2017     Lab Results   Component Value Date    WBC 9.9 04/10/2017    HGB 8.0 (L) 04/11/2017    HCT 24.9 (L) 04/10/2017    MCV 94 04/10/2017     04/11/2017     Mag 3.1  INR 1.19    Assessment  1. NSTEMI S/P CABG x 3- asa, lipitor, BB, heparin  2. Acute resp failure-crackles L base - RA Sat's 97%  3. Type 2 DM- glucoses 106-111  4. Thrombocytopenia -required pre -op and post op platelets- plts. resolved   5. HTN- " Atenolol 25 mg   6. Dyslipidemia - lipitor  7. Acute kidney injury- creat improving 1.6,  (1.8) - baseline 1.38   8. Atrial fib - amiodarone, BB  9. Acute blood loss anemia - Hgb stable 8.0, yesterday 7.7 - Fe therapy  10. Urinary retention- balderrama re inserted 4/9 - on Flowmax -    CXR review with surgeon, no follow up needed.    PLAN  Oral Lasix   K+ in am  Encourage ambulation and IS  Voiding trial prior to discharge.  Neck discomfort some improvement with lidocaine patch.   TCU at discharge     Labs K+, mag

## 2021-06-10 NOTE — PROGRESS NOTES
Plan:  SW following - TCU upon d/c.  Referral to [facility] TCU - awaiting acceptance and availability.  Will need PAS.    SW met with patient to review d/c planning options. He is in agreement with TCU for continued rehab - aware he needs to recover well if he is to continue caring for his wife, who has dementia. [place] north of [place], so prefer a TCU location near family. Pt's sister is currently caring for his wife, so [facility] is nearest to this sister. Referral faxed and called to [facility]. Pt's son is in [place] and Dtr is in [place], so patient feels [place] is the best location but if search needs to be expanded, could refer to [place] or [place]. Much support provided, as this is a very stressful time for Erick and his family. SW following for d/c planning.    Katharina Huggins MSW, LICSW

## 2021-06-10 NOTE — PROGRESS NOTES
"Progress Note  CV Surg   POD # 9  Hgb a1c 6.0  EF  55%    Subjective  \"I don't know, I feel SOB\"    Objective  Up in chair, recent activity and now SOB  VS assessed and sat's 98%, Lungs with crackles in L base unchanged.  SOB resolved without treatment.    Vital signs in last 24 hours  /54 (Patient Position: Lying)  Pulse 71  Temp 97.8  F (36.6  C) (Oral)   Resp 17  Ht 5' 8\" (1.727 m)  Wt (!) 239 lb 4.8 oz (108.5 kg) Comment: nurse aware  SpO2 94%  BMI 36.39 kg/m2  O2 Sat's on RA 94%    Weight:   (!) 239 lb 4.8 oz (108.5 kg)  Admit 105.4  kg    Intake/Output this shift:  Stool LBM 4/14  Torres- 1,650 cc/24hours  Ambulation: 110 x 2    Physical Exam  Neuro: no gross neuro deficits  Lungs: crackles noted L base  Cor: SR with occ PVC  INC: healing  ABD: large round, LBM today  EXT: edema with sock indentations, pitting edema     CXR: Removal of prior drains and tubes. Improved but persistent small pleural effusions. No pneumothorax. Stable cardiomediastinal silhouette enlargement. Sternotomy. Calcified pleural plaques. Mild basilar atelectasis. Apparent small air-fluid level over the right of midline thorax projecting near the third from the top sternotomy wire. This should be followed to exclude small collection.    CONCLUSION: MRI   1. The vertebral body heights are maintained. There are slight retrolistheses of L2 in relation to L3, L3 in relation to L4, and L5 in relation to the sacrum.     2. At the L1-2 disc space level there is mild bilateral lateral recess narrowing.     3. At the L2-3 disc space level there is bilateral lateral recess narrowing.     4. At the L3-4 disc space level there is moderate canal compromise with bilateral lateral recess narrowing and mild left neural foraminal narrowing.     5. At the L4-5 disc space level there is moderate to severe canal compromise with bilateral lateral recess narrowing with moderate right neural foraminal narrowing and mild left neural foraminal " narrowing.     6. At the L5-S1 disc space level there is a right paracentral disc extrusion extending inferiorly. This does lead to displacement the exiting right S1 nerve root sleeve. There is mild bilateral lateral recess narrowing and mild bilateral neural foraminal narrowing.  Pertinent Labs   Lab Results: personally reviewed.   Lab Results   Component Value Date     04/11/2017    K 4.2 04/14/2017     04/11/2017    CO2 23 04/11/2017    BUN 52 (H) 04/11/2017    CREATININE 1.48 (H) 04/12/2017    CALCIUM 7.8 (L) 04/11/2017     Lab Results   Component Value Date    WBC 9.9 04/10/2017    HGB 8.0 (L) 04/11/2017    HCT 24.9 (L) 04/10/2017    MCV 94 04/10/2017     04/11/2017     Mag 2.4  INR 1.19    Assessment  1. NSTEMI S/P CABG x 3- asa, lipitor, BB, heparin  2. Acute resp failure/volume overload peripheral edema is slightly less remains pitting edema +1-2. Weight remains up 3 kg from admission   -crackles L base - RA Sat's 94%- Continue lasix   3. Type 2 DM- glucoses <200    4. Thrombocytopenia -required pre -op and post op platelets- plts. resolved   5. HTN- Atenolol 25 mg   6. Dyslipidemia - lipitor  7. Acute kidney injury- creat improving 1.48 (1.6) - baseline 1.38   8. Atrial fib - amiodarone, BB  9. Acute blood loss anemia - Hgb stable 8.0, yesterday 7.7 - Fe therapy  10. Urinary retention- balderrama re inserted 4/9 - on Flowmax -  11. New right leg weakness-neurosurgery consulted 4/12    Episode of SOB after activity that resolved without intervention. VSS- Sat's on RA 98 %.    PLAN  Would continue diuresis and follow weights  Anticipate discharge to TCU today  Follow up appointment with CV surgery 4/25 at 11:40 at [facility] with Mindy Willingham.

## 2021-06-10 NOTE — PROGRESS NOTES
Patient Name: Almas Gaona   MRN: [id number]   Date of Admission: 3/28/2017    Procedure: CORONARY ARTERY BYPASS GRAFT X 3  ENDOSCOPIC VEIN HARVEST INTERNAL MAMMARY ARTERY ANESTHESIA TRANSESOPHAGEAL ECHOCARDIOGRAM    Post Op day #: 5    Subjective (Patient focus/Primary Problem for shift): Improve breathing.          Pain Goal0-3 Pain Rating0-3           Pain Medication/ Regime effective to reduce patient pain Lidocaine patch helped his neck soreness a lot.  No pain at incisions.    Objective (Physical assessment):           Rhythm: normal sinus rhythm            Bowel Activity: yes if Yes indicate when: 4/10AM          Bowel Medications: no            Incision: healing well          Incentive Spirometry Q 1-2 hour when awake:  yes Volume: 1000          Epicardial Pacing Wires:  no            Patient Activity:           Up to chair for meals: yes          Ambulation with RN x2 (Not including CR): no , too tired this shift after Card rehab walks           Is patient in home clothes:no             Chest Tubes   Pleural: no Draining: not applicable               Suction: not applicable              Mediastinal: no Draining: not applicable               Suction: no   Dressing Change Daily:not applicable If No, why?  [place], cleanse dailys                     Urinary Catheter: yes, due to urinary retention.           Preventative WOC consult (need MD order): no       Assessment (Nursing primary shift focus): Breathing felt better for him after IV lasix this AM.  Good UOP.     Plan (Patient Care Plan/focus): Continue activity and IS.      Deandra Crook   4/10/2017   3:06 PM

## 2021-06-10 NOTE — PROGRESS NOTES
Pt does not like hospital supplied Cpap unit. Has unit at home but does not wish to bring in.   Pt states he is ok with nasal cannula at [facility].

## 2021-06-10 NOTE — PROGRESS NOTES
Progress Note          SUBJECTIVE :       No cp  No fever  C/o pain in the right hip area shooting to the right leg, severe      PLAN :     - plan for MRI of the lumbosacral spine : for sciatica symptoms, oxycontin, flexeril    - switched to oral lasix today    - monitor renal functions    - not yet medically ready for discharge.        Barriers to Discharge : right leg sciatica symptoms  Anticipated discharge : in am ?  Disposition : TCU    TIME SPENT : Total time spent > 35 minutes and > 50% time spent on counseling patient about plan of care and coordination of care.           BRIEF HOSPITAL COURSE :         Admitted with CP and NSTEMI.  S/p CABG.  Post op coarse was complicated by acute hypoxic respiratory failure secondary to Pulmonary congestion and managed with diuresis. Also had post op a fib and managed with amiodarone.  She developed acute right sided sciatica symptoms and MRI of lumbosacral spine showed nerve impingement.  Neurosurgery team consulted.      ASSESSMENT :          1. NSTEMI    2. S/p CABG X 3    3. Post op acute hypoxic respiratory failure :  resolved.    4. Post op Pulmonary congestion : s/p iv lasix, on oral lasix.    5. Post op a fib : s/p amio infusion, on amiodarone    6. Acute on CKD stage 3 : stable.    7. NSTEMI (non-ST elevated myocardial infarction); CAD (coronary artery disease);s/p cardiac cath : Severe multivessel disease, including  of RCA; s/p CABG X 3. On aspirin, atenolol, lipitor, losartan.    8. Right leg sciatic symptoms + Rt. Leg weakness : MRI of lumbosacral spine : nerve impingement, flexeril bid and oxycontin. If symptoms persist - consider MRI of the brain.    9. Anemia, chronic with acute blood loss: stable.    10. Essential Hypertension: controlled on Norvasc, Atenolol, Hyzaar    11. Chronic Medical Issues : Dyslipidemia: Continue Lipitor    12. Type 2 diabetes mellitus: metformin on hold. Continue qid BG checks. Sugars well controlled without any treatment. A1c  3/28 was 6.0. Was on insulin drip, now switched to lantus + SSI.    13. SLOAN on home CPAP at night        Consults and Recommendations :            Diet :  Diabetic - cardiac   , IV fluids :  none   , IV Meds :    , Antibiotics     QTc :  DVT Prophylaxis : heparin  Code Status : Full  Admit status : inpatient  Orders reviewed    Main Concerning Issues :               Objective :            Review of Systems   A 12 point comprehensive review of systems was negative except as noted.        Physical Exam    HEENT : no distended veins, no lymphadenopathy, thyroid is normal  LUNGS : b/l air entry present, no significant crackles/wheezing.  ABDOMEN : soft, non-tender, non-distended, BS present.  HEART :  Regular rate & rhythm, S1 & S2 normal, no murmur, clicks/rubs, no ankle edema  NEURO : conscious, oriented, responds to commands, no obvious focal deficit.  MUSCULOSKELETAL / EXTREMITIES :  no calf tenderness.  SKIN : no rashes  BACK : wnl        Pertinent Labs   Lab Results: personally reviewed.   Lab Results   Component Value Date     04/11/2017    K 4.1 04/12/2017     04/11/2017    CO2 23 04/11/2017    BUN 52 (H) 04/11/2017    CREATININE 1.48 (H) 04/12/2017    CALCIUM 7.8 (L) 04/11/2017           Pertinent Radiology   Radiology Results: Personally reviewed image/s  EKG Results: not done              DR. ADELAIDE ISBELL  [facility] FarmIST

## 2021-06-10 NOTE — PROGRESS NOTES
"CV Surgery POD # 7 CAB     Subjective :  Up in the recliner with legs elevated, c/o right outer thigh pain and leg weakness, he can't lift the leg off of the bed, staff said  He was dragging his foot this am, balderrama replaced this am d/t inability to void     Objective:  /55 (Patient Position: Sitting)  Pulse 76  Temp 97.7  F (36.5  C) (Oral)   Resp 18  Ht 5' 8\" (1.727 m)  Wt (!) 236 lb 12.8 oz (107.4 kg)  SpO2 91%  BMI 36.01 kg/m2     Weight: 107.4, pre 110  Oxygen: 94% RA     Physical Exam:  Neuro: A&O, no focal deficits  Heart: RRR no r/m  Lungs: clear few crackles left base  Abdomen: large and soft +BS and BM  Incisions: CDI  Extremities: warm, bilateral LE edema     Ambulation: ambulated 110 feet x2 yesterday     UO:  UO [id number]/24 hours     Labs:  K 4.5, Mg 2.5, Plt 247  -210     Imaging:  CXR 4/11:  Removal of prior drains and tubes. Improved but persistent small pleural effusions. No pneumothorax. Stable cardiomediastinal silhouette enlargement. Sternotomy. Calcified pleural plaques. Mild basilar atelectasis. Apparent small air-fluid level over the right of midline thorax projecting near the third from the top sternotomy wire. This should be followed to exclude small collection.      Assessment:  1. Acute NSTEMI-s/p urgent CAB  2. Hypertension-stable on atenolol 25 mg qd  3. Hyperlipidemia-on lipitor  4. Diabetes II controlled A1C 6.0%-SSI coverage  5. Thrombocytopenia-resolved  6. Acute post op blood loss anemia-stable  7. Hypervolemia- on daily lasix  8. Urinary retention-balderrama replaced this am, flomax started 4/6  9. New right leg weakness-    Plan:  Will discuss new right leg weakness with surgeon  Leave balderrama today  To TCU at discharge     Mindy Willingham CNP  Pager [phone]        "

## 2021-06-10 NOTE — PROGRESS NOTES
Progress Note          SUBJECTIVE :     No cp  No fever  Creatinine stable    PLAN :     - iv lasix : for pulmonary congestion    - monitor renal functions    - wean off oxygen    - not yet medically ready for discharge.          Barriers to Discharge : elevated creatinine, wean off oxygen, ongoing diuresis  Anticipated discharge : in am ?  Disposition : TCU    TIME SPENT : Total time spent > 35 minutes and > 50% time spent on counseling patient about plan of care and coordination of care.           BRIEF HOSPITAL COURSE :               ASSESSMENT :          1. S/p CABG X 3    2. Post op acute hypoxic respiratory failure : on 2 liters of oxygen    3. Post op Pulmonary congestion : on iv lasix    4. Post op a fib : s/p amio infusion    5. Acute on CKD stage 3    6. NSTEMI (non-ST elevated myocardial infarction); CAD (coronary artery disease);s/p cardiac cath : Severe multivessel disease, including  of RCA; s/p CABG X 3. On atenolol, lipitor, losartan    7. Anemia, chronic with acute blood loss    8. Thrombocytopenia: cleared for CABG by hematology.     9. Essential Hypertension: controlled on Norvasc, Atenolol, Hyzaar    10. Chronic Medical Issues : Dyslipidemia: Continue Lipitor    11. Type 2 diabetes mellitus: metformin on hold. Continue qid BG checks. Sugars well controlled without any treatment. A1c 3/28 was 6.0. Was on insulin drip, now switched to lantus      12. SLOAN on home CPAP at night        Consults and Recommendations :            Diet :  Diabetic - cardiac   , IV fluids :  none   , IV Meds :    , Antibiotics     QTc :  DVT Prophylaxis : heparin  Code Status : Full  Admit status : inpatient  Orders reviewed    Main Concerning Issues :               Objective :            Review of Systems   A 12 point comprehensive review of systems was negative except as noted.        Physical Exam    HEENT : no distended veins, no lymphadenopathy, thyroid is normal  LUNGS : b/l air entry present, no significant  crackles/wheezing.  ABDOMEN : soft, non-tender, non-distended, BS present.  HEART :  Regular rate & rhythm, S1 & S2 normal, no murmur, clicks/rubs, no ankle edema  NEURO : conscious, oriented, responds to commands, no obvious focal deficit.  MUSCULOSKELETAL / EXTREMITIES :  no calf tenderness.  SKIN : no rashes  BACK : wnl        Pertinent Labs   Lab Results: personally reviewed.   Lab Results   Component Value Date     04/10/2017    K 4.3 04/10/2017     04/10/2017    CO2 22 04/10/2017    BUN 56 (H) 04/10/2017    CREATININE 1.66 (H) 04/10/2017    CALCIUM 7.8 (L) 04/10/2017           Pertinent Radiology   Radiology Results: Personally reviewed image/s  EKG Results: not done              DR. ADELAIDE ISBELL  [facility] CenterIST

## 2021-06-10 NOTE — PROGRESS NOTES
Complained of feeling SOB and woozy post activity with Rehab V/S are Stale O2 sats was 98 % RR was 20 /min BP was 118/79 HR was 69 Seen by Emmy Peter , Blood glucose was 138   And continue to monitor family will transport patient  to TCU and he feels comfortable with that did not want to order lunch for now reiterated  And encouraged to eat .but declined to order Lunch .

## 2021-06-10 NOTE — PROGRESS NOTES
"Cardiac Rehab Discharge Summary    Problem List  Patient Active Problem List    Diagnosis Date Noted     Acute back pain      Lumbar stenosis      S/P CABG x 3 04/05/2017     Coronary artery disease due to lipid rich plaque      Essential hypertension      Thrombocytopenia      NSTEMI (non-ST elevated myocardial infarction) 03/28/2017     Hypertension 03/28/2017     Dyslipidemia 03/28/2017     CAD (coronary artery disease) 03/28/2017     Type 2 diabetes mellitus 03/28/2017       Pertinent Medical History  Pertinent Medical History: CAD, MI, HTN, Hyperlipidemia, SLOAN (anklesorness )    Risk Factors  Risk Factors: Hypertension, High cholesterol, Obesity, Stress, Inactivity, Diabetes Mellitus    Living Situation  Living Accomodations: Spouse, House (wife ill cva tremors, kid dz, live far out 1hr to closest ho)    Vitals  Height: 5' 8\" (172.7 cm)     Weight: (!) 239 lb 4.8 oz (108.5 kg) (nurse aware)      Labs    Hemoglobin A1c   Date/Time Value Ref Range Status   03/28/2017 09:44 AM 6.0 4.2 - 6.1 % Final     Troponin I   Date/Time Value Ref Range Status   03/28/2017 09:23 PM 6.19 (HH) 0.00 - 0.29 ng/mL Final     Lab Results   Component Value Date    CHOL 160 03/28/2017     Lab Results   Component Value Date    HDL 41 03/28/2017     Lab Results   Component Value Date    LDLCALC  03/28/2017      Comment:      Invalid, Triglycerides >400     Lab Results   Component Value Date    TRIG 419 (H) 03/28/2017         Treatment Progression: Maximal Activity and Exercise Level  Transfers  Bed Mobility: Mod A, Assist of 1  Bed to Chair: Min A, Mod A, Assist of 1  Chair to Stand: Min A, Mod A, Assist of 1  Transfer Comments:     Walking/Marching  Distance: 110 ft x 2-320 ft.  Met Level: 3  Peak HR: 80  Peak BP: 132/78  Ambulation Status: Min A, Assist of 1  Device: Walker  Comment: leg pain and stiffness improved with exercise                   Nustep  Time: 6-10 minutes  Workload: L2  Met Level: 2  Peak HR: 75  Peak BP: 140/68  Arm " Use: No         Tolerance Level/Symptoms/Complications       Symptomatic: sore right leg    EKG/Arrhythmias  ECG: Sinus Rhythm    Discharge Disposition  Discharge   Recommended Discharge Disposition: TCU  Outpatient Location: Other (Comment) ([place])  Order Received On: 04/05/17  Comment: feels will be ready to go to his home after TCU so OP at [facility]. Release of Info signed.  Order and release of information faxed to Outpatient Cardiac Rehab at [facility] in [place], [place]. 4/14/17  Pt. Discharged to TCU on 4/14/17

## 2021-06-10 NOTE — PROGRESS NOTES
..  Patient Name: Almas Gaona   MRN: [id number]   Date of Admission: 3/28/2017    Procedure: CORONARY ARTERY BYPASS GRAFT X 3  ENDOSCOPIC VEIN HARVEST INTERNAL MAMMARY ARTERY ANESTHESIA TRANSESOPHAGEAL ECHOCARDIOGRAM    Post Op day #:9    Subjective (Patient focus/Primary Problem for shift):pain management/activity level/using of IS/ and BM.           Pain Goal 2 Pain Rating 1           Pain Medication/ Regime effective to reduce patient pain Oxycontin 10 mg    Objective (Physical assessment):           Rhythm: normal sinus rhythm            Bowel Activity: no if Yes indicate when:           Bowel Medications: yes            Incision: healing well          Incentive Spirometry Q 1-2 hour when awake:  yes Volume:1250           Epicardial Pacing Wires:  no            Patient Activity:           Up to chair for meals: yes          Ambulation with RN x2 (Not including CR): yes            Is patient in home clothes:no             Chest Tubes   Pleural: no Draining: no               Suction: not applicable              Mediastinal: not applicable Draining: not applicable               Suction: not applicable   Dressing Change Daily:yes If No, why? N/A                     Urinary Catheter: yes           Preventative WOC consult (need MD order): not applicable       Assessment (Nursing primary shift focus): pain control/ambulation/IS use/BM/ongoing neurological status monitoring.    Plan (Patient Care Plan/focus): walk the patient,assessing pain/vs, response to activity.       4/13/2017   6:05 PM

## 2021-06-10 NOTE — CONSULTS
NEUROSURGERY CONSULT NOTE     Almas Gaona   [address] [place] [address]  76 y.o.  male  Admission Date/Time: 3/28/2017  8:58 AM  Primary Care Provider:  David Mclean MD    Date of service: 4/13/2017    Informant: Patient    Neurosurgery was asked to see this patient by Dr. Rodriguez for evaluation of right leg pain and lumbar stenosis.       IMPRESSION/PLAN:    76 y.o. male admitted after NSTEMI and underwent Triple vessel coronary artery bypass grafting. Has a 30 year history of back pain radiating down right leg. This pain waxes and wanes. Yesterday it was worse. MRI showed L3-L5 stenosis.     1.  He does not appear to have cauda equina. We will have him follow up in our clinic in 4 weeks. May need a laminectomy. Non emergent so can wait until he recovers from cardia surgery.  2.  Obesity: losing weight will improve his symptoms.   3.   If he becomes worse he should be seen sooner.     Patient Active Problem List   Diagnosis     NSTEMI (non-ST elevated myocardial infarction)     Hypertension     Dyslipidemia     CAD (coronary artery disease)     Type 2 diabetes mellitus     Thrombocytopenia     Coronary artery disease due to lipid rich plaque     Essential hypertension     S/P CABG x 3     Acute back pain       NEUROSURGERY ATTENDING: The patient's clinical examination, laboratory data,and the plan was reviewed and made collaboratively with Dr. Sukhjinder Magana..    HPI:   76 y.o. male admitted after NSTEMI and underwent Triple vessel coronary artery bypass grafting 7 days ago. Has a 30 year history of back pain radiating down right leg. This pain waxes and wanes. Yesterday it was worse. The pain goes down back of leg ending on side of right foot. Numbness is outer right thigh only. No weakness.  MRI showed L3-L5 stenosis.     Urinary postoperatively. Now with balderrama. Had BM 2 days ago. Taking pain medications.     REVIEW OF SYSTEMS:  Review of Systems - All others Negative except otherwise mentioned.          SOCIAL HISTORY:   Social History     Social History     Marital status:      Spouse name: N/A     Number of children: N/A     Years of education: N/A     Social History Main Topics     Smoking status: Former Smoker     Packs/day: 1.00     Years: 30.00     Types: Cigarettes     Start date: 3/28/1961     Quit date: 3/28/1991     Smokeless tobacco: None     Alcohol use No      Comment: occasional drinks     Drug use: No     Sexual activity: Not Asked     Other Topics Concern     None     Social History Narrative       FAMILY HISTORY:  Family History   Problem Relation Age of Onset     Diabetes Brother        SURGERY HISTORY:  Past Surgical History:   Procedure Laterality Date     CARDIAC CATHETERIZATION N/A 3/28/2017    Procedure: Coronary Angiogram;  Surgeon: Sophy Quick MD;  Location: [facility] Cath Lab;  Service:      CORONARY ARTERY BYPASS GRAFT N/A 4/5/2017    Procedure: CORONARY ARTERY BYPASS GRAFT X 3  ENDOSCOPIC VEIN HARVEST INTERNAL MAMMARY ARTERY ANESTHESIA TRANSESOPHAGEAL ECHOCARDIOGRAM;  Surgeon: Laila Marquis MD;  Location: [facility] OR;  Service:      WV L HRT CATH W/NJX L VENTRICULOGRAPHY IMG S&I N/A 3/28/2017    Procedure: Left Heart Catheterization with Left Ventriculogram;  Surgeon: Sophy Quick MD;  Location: [facility] Cath Lab;  Service: Cardiology       ALLERGIES/SENSITIVITIES:     Allergies   Allergen Reactions     Sulfamethoxazole-Trimethoprim Rash       MEDICATIONS:  Prescriptions Prior to Admission   Medication Sig Dispense Refill Last Dose     allopurinol (ZYLOPRIM) 100 MG tablet Take 100 mg by mouth daily.   3/27/2017 at Unknown time     amLODIPine (NORVASC) 5 MG tablet Take 5 mg by mouth daily.   3/27/2017 at Unknown time     aspirin 325 MG tablet Take 325 mg by mouth every evening.   3/27/2017 at Unknown time     atenolol (TENORMIN) 25 MG tablet Take 25 mg by mouth daily.   3/27/2017 at Unknown time     atorvastatin (LIPITOR) 20 MG tablet Take 20 mg by  "mouth every morning.   3/27/2017 at Unknown time     irbesartan-hydrochlorothiazide (AVALIDE) 300-12.5 mg per tablet Take 1 tablet by mouth daily.   3/27/2017 at Unknown time     latanoprost (XALATAN) 0.005 % ophthalmic solution Administer 1 drop to both eyes at bedtime.   3/27/2017 at Unknown time     metFORMIN (GLUCOPHAGE) 500 MG tablet Take 500 mg by mouth 2 (two) times a day with meals.   3/27/2017 at Unknown time     niacin 250 MG tablet Take 250 mg by mouth 2 (two) times a day with meals.   3/27/2017 at Unknown time     tamsulosin (FLOMAX) 0.4 mg Cp24 Take 0.4 mg by mouth daily after supper.   3/27/2017 at Unknown time       PHYSICAL EXAM:     General Appearance:  in no apparent distress and well developed and well nourished      VITAL SIGNS:  /59 (Patient Position: Lying)  Pulse 62  Temp 98  F (36.7  C) (Oral)   Resp 18  Ht 5' 8\" (1.727 m)  Wt (!) 236 lb 12.8 oz (107.4 kg)  SpO2 94%  BMI 36.01 kg/m2  Body mass index is 36.01 kg/(m^2).      Herbert Coma Score  Eye openin - Opens eyes on own   Verbal:  5 - Alert and oriented   Motor:  6 - Follows simple motor commands   GCS Total: 15       alert and oriented to time, date, person, place, city, president, Speech normal  Recent and remote memory: normal  Attention span and concentration: Able to recite the days of the week in reverse order  Fund of knowledge: discusses current events      Cranial Nerves  II: Visual fields  intact to confrontation    II: Pupils PERRL    III, IV, IV: Extraocular movements Full ROM   VI: Ptosis none    V:Sensation intact to light touch bilaterally    VII: Motor face symmetric    VIII: Hearing intact to finger rub bilaterally    IX, X: Soft palate elevation, gag palate elevates symmetrically    XI: Shoulder shrug strong and equal    XII: Tongue midline      Motor Strength Arms:  Pronator drift  none none   Grasp  R 5/5  L 5/5   Bicep  R 5/5  L 5/5   Tricep  R 5/5  L 5/5   Deltoid  R 5/5  L 5/5       Motor Strength " Legs:  Dorsiflexion  R 5/5  L 5/5   Plantar flexion  R 5/5  L 5/5   Hip flexion  R 5/5  L 5/5       Coordination: Finger to nose and heel to shin smooth and accurate bilaterally    Sensation: numb right outer thigh  Bulk and tone: normal  Reflexes: biceps, triceps, brachioradialis, patellar and achilles Hyper reflexia right triceps and biceps  No clonus, no Mcclelland's, toes down  Extremities: bilateral lower extremity edema  Skin: no rashes, no ecchymoses, no petechiae  Psych:age appropriate   Torres catheter. Normal rectal tone.       IMAGING:   Personally reviewed image/s and Personally reviewed impression/s. Discussed with Dr. Sukhjinder Magana.  CONCLUSION:  1. The vertebral body heights are maintained. There are slight retrolistheses of L2 in relation to L3, L3 in relation to L4, and L5 in relation to the sacrum.     2. At the L1-2 disc space level there is mild bilateral lateral recess narrowing.     3. At the L2-3 disc space level there is bilateral lateral recess narrowing.     4. At the L3-4 disc space level there is moderate canal compromise with bilateral lateral recess narrowing and mild left neural foraminal narrowing.     5. At the L4-5 disc space level there is moderate to severe canal compromise with bilateral lateral recess narrowing with moderate right neural foraminal narrowing and mild left neural foraminal narrowing.     6. At the L5-S1 disc space level there is a right paracentral disc extrusion extending inferiorly. This does lead to displacement the exiting right S1 nerve root sleeve. There is mild bilateral lateral recess narrowing and mild bilateral neural   foraminal narrowing    LABS:  INR   Date Value Ref Range Status   04/06/2017 1.19 (H) 0.90 - 1.10 Final     PTT   Date Value Ref Range Status   03/28/2017 57 (H) 24 - 37 seconds Final     Platelets   Date Value Ref Range Status   04/11/2017 247 140 - 440 thou/uL Final     Sodium   Date Value Ref Range Status   04/11/2017 139 136 - 145  mmol/L Final            Thank you for allowing us to participate in the care of this patient.        Debra Bell, [facility] Neurosurgery  [phone]

## 2021-06-10 NOTE — PROGRESS NOTES
Progress Note        Admitted with CP and NSTEMI, S/p CABG. Post op coarse was complicated by acute hypoxic respiratory failure secondary to Pulmonary congestion and managed with diuresis. Also had post op a fib and managed with amiodarone. He developed acute right sided sciatica symptoms and MRI of lumbosacral spine showed nerve impingement.    SUBJECTIVE :   Patient was sitting in chair upon entering in the room.  He was awake and alert.  He told me that his right hip and right leg pain is little better compared to yesterday.    Review of system:  No chest pain, shortness of breath nausea or vomiting.  Objective:  No obvious distress    ASSESSMENT AND PLAN :  1. S/p CABG x 3 on April 5, 2017.  LIMA to LAD, SVG to OM, SVG to RCA.  Patient is doing well.  Hemodynamically stable.  Continue with aspirin, atenolol, Lipitor.  2. Post op acute hypoxic respiratory failure: Resolved.  Currently patient is on room air  3. Right S1 nerve root impingement with right leg pain: Seen on MRI done on April 12, 2017.  Neurosurgery to evaluate.  4. Post op Pulmonary congestion : s/p iv lasix, now on oral lasix.  Weight is not being measured in the last 2 days but previously it was trending down.  Patient still having bilateral lower extremity swelling.  Recheck patient's weight and kidney function in morning.  5. Postop urinary retention: Continue with Torres catheter.  Continue with Flomax.  Voiding trial before discharge if not possible and patient will be going to need urology follow-up for voiding trial as outpatient.  6. Post op a fib : s/p amio infusion, on amiodarone ending on May 9, 2017  7. Acute on CKD stage 3 : stable. Repeat kidney function in morning.  8. Anemia, chronic with acute blood loss: stable.  9. Essential Hypertension: controlled on Norvasc, Atenolol, Hyzaar  10. Dyslipidemia: Continue with Lipitor  11. Type 2 diabetes mellitus:  A1c 3/28 was 6.0. Metformin on hold.  Blood sugars have been running in decent  range.  Continue qid BG checks.   12. SLOAN on home CPAP at night  13. DVT Prophylaxis : heparin      Physical Exam  HEENT : Supple  LUNGS : CTA   ABDOMEN : soft, non-tender, non-distended, BS present.  HEART :  S1 & S2 n  NEURO : conscious, oriented, responds to commands, no obvious focal deficit.  EXT: 2 + swelling   SKIN : Incision skin is healing well    Pertinent Labs   Lab Results: personally reviewed.   Lab Results   Component Value Date     04/11/2017    K 4.3 04/13/2017     04/11/2017    CO2 23 04/11/2017    BUN 52 (H) 04/11/2017    CREATININE 1.48 (H) 04/12/2017    CALCIUM 7.8 (L) 04/11/2017       Pertinent Radiology   Radiology Results: Personally reviewed image/s  EKG Results: not done    Barriers to Discharge : Neurosurgical assessment  Anticipated discharge : 4/14/2017 ?  Disposition : TCU    TIME SPENT : Total time spent > 35 minutes and > 50% time spent on counseling patient about plan of care and coordination of care.

## 2021-06-10 NOTE — PROGRESS NOTES
"Neurosurgery Progress Note:    Received a call from Dr. Rodriguez regarding about this patient experiencing right outer thigh pain and leg weakness.    CC: patient reports right outer thigh pain and right leg weakness started after his surgery and progressively increased the last two days. Patient reports he has intermittent low back pain radiating to his right leg for \"30 years\" and he had MRI of his lumbar \"few years ago\". He was told his MRI result was \"abnormal\" and doesn't remember what the problem was.  Torres replaced this morning because of inability to void and large PVR.     HPI: Almas Gaona is a 76 y.o. old male with history of hypertension, dyslipidemia, diabetes mellitus, history of CAD, had presented to ED with complaint of chest pain, that woke him up from sleep. The pain was eventually relieved by sublingual nitroglycerin. Coronary angiogram showed Severe multivessel disease, including  of RCA.  He is status post Coronary artery bypass graft surgery on 04/05/17.     Subj:  Up in the recliner with legs elevated, c/o right outer thigh pain and leg weakness. More pain when ambulating.    Plan:  Spoke with Dr. rodriguez and he will order MRI of lumbar and re-consult neurosurgery.      Mark Hatfield, [facility] Neurosurgery  [address], [address]  [place] [address]        "

## 2021-06-10 NOTE — PROGRESS NOTES
Discharge plan:  TCU. Accepted at [facility].  Ph:  [phone].    Progression/Coordination of care:    Referral sent on 4/10 and call to check on status of referral.    Initially they were not able to accept him until Friday, but a room is now available for him on 4/12 if he is medically ready to d/c.    After consulting with Attending, he states tomorrow is the plan.    Updated patient on this information and he wanted me to call his sister Radha and she will update his wife who is staying with Radha during his recovery.    I explained to patient and Radha that if he is able to d/c that we would prefer as close to 12-1pm as possible.  PAS completed.    Transportation:  His sister Radha and brother in-law Riaz.

## 2021-06-10 NOTE — DISCHARGE SUMMARY
Physician Discharge Summary        Primary Care Physician:  David Mclean MD Admission Date: 3/28/2017   Discharge Provider: Sha Saucdeo Discharge Date: 4/14/2017 12:57 PM   Disposition: Skilled Nursing Facility Code Status: Full Code   Activity: Per cardiothoracic surgery recommendations Diet: cardiac diet     Hospital Summary:      This is 76-year-old male with past medical history of essential hypertension, hyperlipidemia, diabetes mellitus type 2, BPH, Coronary artery disease presented to ER on March 28, 2017 on account of chest pain.  Patient was diagnosed with non-ST elevation MI.  Patient was started on heparin.  Patient was taken to coronary angiogram on March 28, 2017.  Patient was diagnosed with severe multivessel disease including  of RCA.  Patient had normal left main, proximal to mid left anterior descending 80% lesion, mid circumflex 80% lesion, proximal right coronary artery 90% lesion, distal coronary artery to 50% lesion.  Dr. Baires was consulted.  Patient was recommended CABG on 03/31/2017.  Surgery was canceled due to platelet concerns.  Hematology was consulted.  Recommended platelet transfusion prior to surgery if needed otherwise no specific source was known.  Patient underwent CABG x 3 on April 5, 2017.  Patient started doing better patient's chest tubes eventually came out.  Patient was continued to be diuresed for better volume status.  Patient was diuresed and then switch to oral Lasix.  Patient also had A. fib post CABG needed amiodarone 2 May 9, 2017.  Patient has a history of BPH and he failed voiding trial for which reason Torres catheter had to be inserted back and and patient was discharged to a TCU with Torres catheter with an understanding to try another voiding trial there.  Once patient got stabilized he was discharged to TCU on April 14, 2017.  Follow-up with cardio thoracic surgery per their recommendation follow-up with hematology as outpatient in 3-4 months.      Condition at Discharge:      Stable    Discharge Diagnoses:       Principal Problem:    NSTEMI (non-ST elevated myocardial infarction)  Active Problems:    Hypertension    Dyslipidemia    CAD (coronary artery disease)    Type 2 diabetes mellitus    Thrombocytopenia    Coronary artery disease due to lipid rich plaque    Essential hypertension    S/P CABG x 3    Acute back pain    Lumbar stenosis      Significant Diagnostic Studies:     Component      Latest Ref Rng & Units 4/11/2017 4/12/2017 4/13/2017 4/14/2017   Sodium      136 - 145 mmol/L 139      Potassium      3.5 - 5.0 mmol/L 3.9      Chloride      98 - 107 mmol/L 105      CO2      22 - 31 mmol/L 23      Anion Gap, Calculation      5 - 18 mmol/L 11      Glucose      70 - 125 mg/dL 111      Calcium      8.5 - 10.5 mg/dL 7.8 (L)      BUN      8 - 28 mg/dL 52 (H)      Creatinine      0.70 - 1.30 mg/dL 1.60 (H) 1.48 (H)     GFR MDRD Af Amer      >60 mL/min/1.73m2 51 (L) 56 (L)     GFR MDRD Non Af Amer      >60 mL/min/1.73m2 42 (L) 46 (L)     Hemoglobin      14.0 - 18.0 g/dL 8.0 (L)      Phosphorus      2.5 - 4.5 mg/dL 3.7      Magnesium      1.8 - 2.6 mg/dL 2.6  2.5 2.4     Xr Chest Ap Portable    Result Date: 4/6/2017  XR CHEST AP PORTABLE 4/6/2017 4:46 AM INDICATION: S/P CABG X 3 COMPARISON: 04/05/2017 at 1300 hours FINDINGS: Endotracheal tube has been removed. A [place]-Ree catheter terminates in the main PA. Subxiphoid drains remain. Small left greater than right bibasilar opacities likely represent atelectasis and pleural fluid; underlying airspace disease is not excluded. There is slightly increased edema from the prior study. The cardiomediastinal silhouette is enlarged.    Xr Chest Ap Portable    Result Date: 4/5/2017  XR CHEST AP PORTABLE 4/5/2017 11:59 AM INDICATION: S/P CABG X 3 COMPARISON: 3/28/2017 FINDINGS: A new endotracheal tube tip terminates 4.5 cm from the francine. A right IJ sheath transmits a PA catheter which terminates in the main pulmonary  artery. Subxiphoid drains are present. Left basilar opacities likely represent atelectasis, though a  small 1 mL pleural fluid or airspace disease cannot be excluded. There is mild edema. The cardiomediastinal silhouette is enlarged. Sternotomy suture wires are intact.    Xr Chest Pa And Lateral    Result Date: 4/11/2017  XR CHEST PA AND LATERAL 4/11/2017 8:06 AM INDICATION: reassess pleural effusion COMPARISON: None. FINDINGS: Removal of prior drains and tubes. Improved but persistent small pleural effusions. No pneumothorax. Stable cardiomediastinal silhouette enlargement. Sternotomy. Calcified pleural plaques. Mild basilar atelectasis. Apparent small air-fluid level over the right of midline thorax projecting near the third from the top sternotomy wire. This should be followed to exclude small collection.     Xr Chest Pa And Lateral    Result Date: 4/8/2017  XR CHEST PA AND LATERAL 04/08/2017, 9:34 AM INDICATION: Status post CABG x3. COMPARISON: 4/6/2017 FINDINGS: Right internal jugular [place]-Ree catheter has been intervally removed. Left-sided thoracostomy tube and mediastinal drains remain. Small bilateral pleural effusions, left larger than right, with minor bibasilar atelectasis or infiltrates. Presumed calcified right-sided pleural-based plaques overlying the right midlung zone. Stable cardiomegaly with changes of prior CABG. No definite pulmonary edema or appreciable pneumothorax. Degenerative change in the visualized spine.     Xr Chest Pa And Lateral    Result Date: 3/28/2017  XR CHEST PA AND LATERAL 3/28/2017 4:06 PM INDICATION: preop CABG COMPARISON: 3/28/2017 at 0337 hours FINDINGS: No pleural fluid or pneumothorax. There are no focal airspace opacities. Heterogeneous likely calcified opacities in the midlungs are nonspecific though could represent nodules, pleural calcifications, or musculoskeletal opacities. Recommend comparison with any prior studies. If none are available, nonemergent chest CT is  recommended. There is no significant edema. The cardiomediastinal silhouette is at the upper limits of normal size.    Mr Lumbar Spine Without Contrast    Result Date: 4/12/2017  [facility] MR LUMBAR SPINE WO CONTRAST 4/12/2017 5:19 PM INDICATION: acute on chronic right sciatica with acute right leg weakness TECHNIQUE: MRI of the lumbar spine without contrast. COMPARISON: None. FINDINGS: The vertebral body heights are well-maintained throughout the lumbar region and have appropriate signal characteristics except for diffuse degenerative endplate changes. There is slight retrolisthesis of L2 in relation to L3 and L3 in relation to L4 and L5 in relation to the sacrum. There are 5 lumbar type vertebral bodies. No pars defect. The conus tip is identified at T12. There is no abnormal signal or change in size of the conus medullaris. There is no evidence of an intracanal mass or hemorrhage.. Grossly normal paraspinal soft tissues. Normal aortic diameter. The visualized bony pelvis and proximal femora are grossly normal. T12-L1: There is a mild loss of disc space height and signal. There is no significant disc bulge or herniation visualized to lead to canal compromise. The lateral recesses and the neural foramen are patent bilaterally. There is minimal facet arthropathy visualized. L1-L2: There is a moderate loss of disc space height and signal. There is a mild diffuse annular bulge more prominent to the posterior lateral regions left greater than right. There is no significant canal compromise. There is facet arthropathy leading to mild bilateral lateral recess narrowing but the neural foramen are patent bilaterally. L2-L3: There is a mild loss of disc space height and signal. There is a diffuse annular bulge more prominent to the posterior lateral regions. There is no significant canal compromise. There is facet arthropathy that does lead to bilateral lateral recess  narrowing. The neural foramen are patent  bilaterally. L3-L4: There is a mild loss of disc space height and signal. There is a diffuse annular bulge more prominent to the posterior lateral region. This along with the facet arthropathy which have small joint effusions leads to moderate canal compromise with bilateral lateral recess narrowing and to mild left neural foraminal narrowing. L4-L5: There is a mild loss of disc space height and signal. There is a diffuse annular bulge more prominent to the posterior lateral regions left greater than right. This along with the facet arthropathy leads to moderate to severe canal compromise with  bilateral lateral recess narrowing with moderate right neural foraminal narrowing and mild left neural foraminal narrowing. L5-S1: There is mild loss of disc space height and signal. There is a diffuse annular bulge more prominent to the posterior lateral regions along with a right paracentral disc extrusion extending inferiorly. This does lead to some mild displacement of the exiting right S1 nerve root sleeve. There is no evidence of canal compromise. There is mild facet arthropathy leading to mild bilateral lateral recess narrowing. There is mild bilateral neural foraminal narrowing noted.     CONCLUSION: 1.  The vertebral body heights are maintained. There are slight retrolistheses of L2 in relation to L3, L3 in relation to L4, and L5 in relation to the sacrum. 2.  At the L1-2 disc space level there is mild bilateral lateral recess narrowing. 3.  At the L2-3 disc space level there is bilateral lateral recess narrowing. 4.  At the L3-4 disc space level there is moderate canal compromise with bilateral lateral recess narrowing and mild left neural foraminal narrowing. 5.  At the L4-5 disc space level there is moderate to severe canal compromise with bilateral lateral recess narrowing with moderate right neural foraminal narrowing and mild left neural foraminal narrowing. 6.  At the L5-S1 disc space level there is a right  paracentral disc extrusion extending inferiorly. This does lead to displacement the exiting right S1 nerve root sleeve. There is mild bilateral lateral recess narrowing and mild bilateral neural foraminal narrowing.    Us Carotid Bilateral    Result Date: 3/28/2017  US CAROTID BILATERAL 3/28/2017 6:19 PM INDICATION: CAD preop CABG TECHNIQUE: Duplex exam performed utilizing 2D gray-scale imaging, Doppler interrogation with color-flow and spectral waveform analysis. COMPARISON: None. FINDINGS: RIGHT: There is mild atheromatous plaque. Normal waveforms with no significant stenosis. LEFT: There is mild atheromatous plaque. Normal waveforms with no significant stenosis. There is abnormal waveform involving the right vertebral artery, with the appearance that of complete subclavian steal. The right subclavian artery waveform is normal. The left vertebral arteries and subclavian artery waveforms are normal. VELOCITY CHART: The following velocities were obtained in the RIGHT carotid system. CCA: 99/12 cm/s ICA: 99/25 cm/s ECA: 118/16 cm/s ICA/CCA: PS 0.99 The following velocities were obtained in the LEFT carotid system. CCA: 101/20 cm/s ICA: 90/29 cm/s ECA: 130/17 cm/s ICA/CCA: PS 0.89                            CONCLUSION: 1.  Mild atheromatous plaque in the carotid arteries. 2.  No significant stenosis in the carotid arteries. 3.  Abnormal right vertebral artery waveform, with the appearance suggestive of complete subclavian steal. Evaluation based on velocities and NASCET criteria. NOTE: ABNORMAL REPORT THE DICTATION ABOVE DESCRIBES AN ABNORMALITY FOR WHICH FOLLOW-UP IS NEEDED.     Us Abdomen Limited    Result Date: 3/31/2017  US ABDOMEN LIMITED 3/31/2017 6:09 PM INDICATION: thrombocytopenia, to assess spleen size TECHNIQUE: Routine. COMPARISON: None. FINDINGS: The spleen is normal size measuring 9.8 x 4.8 x 3.6 cm. No splenic lesion seen.         Procedure or Surgery:     Angiogram: 03/28/2017  CABG x 3  04/05/2017    Discharge Medications:         Medication List      START taking these medications          acetaminophen 325 MG tablet   Quantity:  30 tablet   Dose:  650 mg   Commonly known as:  TYLENOL   650 mg, Oral, Q4H PRN       amiodarone 200 MG tablet   Quantity:  50 tablet   Dose:  200 mg   Commonly known as:  PACERONE   200 mg, Oral, BID       ascorbic acid (vitamin C) 500 MG tablet   Quantity:  20 tablet   Dose:  500 mg   Commonly known as:  VITAMIN C   500 mg, Oral, BID       aspirin 81 mg chewable tablet   Quantity:  30 tablet   Dose:  81 mg   81 mg, Oral, DAILY   Replaces:  aspirin 325 MG tablet       docusate sodium 100 MG capsule   Quantity:  20 capsule   Dose:  100 mg   Commonly known as:  COLACE   100 mg, Oral, BID       ferrous sulfate 325 (65 FE) MG tablet   Quantity:  60 tablet   Dose:  1 tablet   1 tablet, Oral, BID with meals       furosemide 20 MG tablet   Quantity:  60 tablet   Dose:  20 mg   Commonly known as:  LASIX   20 mg, Oral, BID - diuretic       NovoLOG 100 unit/mL injection   Quantity:  10 mL   Generic drug:  insulin aspart   Subcutaneous, 3 times daily with meals, BG < 70 mg/dL: Treat, and call MD, BG  mg/dL: None - 0 Units, -180 mg/dL: 2 units, -220 mg/dL: 4 units, -260 mg/dL: 6 units, -300 mg/dL: 8 units, -340 mg/dL: 10 units, -400 mg/dL: 12 units, BG > 400 mg/dL: 14 units and call MD       omeprazole 20 MG capsule   Quantity:  14 capsule   Dose:  20 mg   Commonly known as:  PriLOSEC   20 mg, Oral, Daily before brkfst       traZODone 50 MG tablet   Quantity:  30 tablet   Dose:  50 mg   Commonly known as:  DESYREL   50 mg, Oral, Bedtime PRN         CONTINUE taking these medications          allopurinol 100 MG tablet   Quantity:  30 tablet   Dose:  100 mg   Commonly known as:  ZYLOPRIM   100 mg, Oral, DAILY       atenolol 25 MG tablet   Quantity:  30 tablet   Dose:  25 mg   Commonly known as:  TENORMIN   25 mg, Oral, DAILY       atorvastatin  20 MG tablet   Quantity:  30 tablet   Dose:  20 mg   Commonly known as:  LIPITOR   20 mg, Oral, QAM       latanoprost 0.005 % ophthalmic solution   Quantity:  2.5 mL   Dose:  1 drop   Commonly known as:  XALATAN   1 drop, Both Eyes, QHS       niacin 250 MG tablet   Quantity:  30 tablet   Dose:  250 mg   250 mg, Oral, BID with meals       tamsulosin 0.4 mg Cp24   Quantity:  30 capsule   Dose:  0.4 mg   Commonly known as:  FLOMAX   0.4 mg, Oral, Daily after supper         STOP taking these medications          amLODIPine 5 MG tablet   Commonly known as:  NORVASC       aspirin 325 MG tablet   Replaced by:  aspirin 81 mg chewable tablet       irbesartan-hydrochlorothiazide 300-12.5 mg per tablet   Commonly known as:  AVALIDE       metFORMIN 500 MG tablet   Commonly known as:  GLUCOPHAGE           Discharge Instructions:     Follow up appointment with Primary Care Physician: David Mclean MD  Follow up appointment with Specialist: Cardiothoracic surgery, cardiology and hematology as outpatient.     Vital Signs in last 24 hours:        Temp:  [97.4  F (36.3  C)-98.9  F (37.2  C)] 97.4  F (36.3  C)  Heart Rate:  [66-71] 69  Resp:  [17-20] 17  BP: (102-146)/(48-67) 102/61    Consult/s     cardiology, hematology/oncology and Cardiothoracic surgery    Physical Exam:       General appearance: Alert, Awake, No distress   Head: Atramatic   Eyes: EOMI   Neck:  Supple   Lungs: clear to auscultation bilaterally   Heart:  S1, S2 normal   Abdomen: Soft, NT, ND and BS+   Extremities:  1-2+ swelling   Neuro: AO 3     Total time spent in preparation of this discharge summary is 35 minutes. 50% of the time spent is in care coordination.    Sha Saucedo M.D  [facility]  [phone]

## 2021-06-10 NOTE — PROGRESS NOTES
Discharge plan:  TCU. Accepted at [facility].  Ph:  [phone].    Progression/Coordination of care:     Patient has right leg pain and weakness and urinary retention - balderrama replaced.  Ortho consulted.  PAS completed.  Consulted with attending and chart reviewed.  Spoke with Ashlyn at [facility] and she can still accept him on 4/13 at the TCU.    Transportation:  His sister Radha and brother in-law Katherine

## 2021-06-10 NOTE — PROGRESS NOTES
Progress Note          SUBJECTIVE :       No cp  No fever  Creatinine stable      PLAN :     - switched to oral lasix today    - monitor renal functions    - wean off oxygen    - not yet medically ready for discharge.        Barriers to Discharge : stable creatinine   Anticipated discharge : in am ?  Disposition : TCU    TIME SPENT : Total time spent > 35 minutes and > 50% time spent on counseling patient about plan of care and coordination of care.           BRIEF HOSPITAL COURSE :               ASSESSMENT :          1. S/p CABG X 3    2. Post op acute hypoxic respiratory failure : on 2 liters of oxygen, resolved.    3. Post op Pulmonary congestion : on iv lasix    4. Post op a fib : s/p amio infusion    5. Acute on CKD stage 3 : stable.    6. NSTEMI (non-ST elevated myocardial infarction); CAD (coronary artery disease);s/p cardiac cath : Severe multivessel disease, including  of RCA; s/p CABG X 3. On aspirin, atenolol, lipitor, losartan.    7. Anemia, chronic with acute blood loss: stable.    8. Essential Hypertension: controlled on Norvasc, Atenolol, Hyzaar    9. Chronic Medical Issues : Dyslipidemia: Continue Lipitor    10. Type 2 diabetes mellitus: metformin on hold. Continue qid BG checks. Sugars well controlled without any treatment. A1c 3/28 was 6.0. Was on insulin drip, now switched to lantus + SSI.    11. SLOAN on home CPAP at night        Consults and Recommendations :            Diet :  Diabetic - cardiac   , IV fluids :  none   , IV Meds :    , Antibiotics     QTc :  DVT Prophylaxis : heparin  Code Status : Full  Admit status : inpatient  Orders reviewed    Main Concerning Issues :               Objective :            Review of Systems   A 12 point comprehensive review of systems was negative except as noted.        Physical Exam    HEENT : no distended veins, no lymphadenopathy, thyroid is normal  LUNGS : b/l air entry present, no significant crackles/wheezing.  ABDOMEN : soft, non-tender,  non-distended, BS present.  HEART :  Regular rate & rhythm, S1 & S2 normal, no murmur, clicks/rubs, no ankle edema  NEURO : conscious, oriented, responds to commands, no obvious focal deficit.  MUSCULOSKELETAL / EXTREMITIES :  no calf tenderness.  SKIN : no rashes  BACK : wnl        Pertinent Labs   Lab Results: personally reviewed.   Lab Results   Component Value Date     04/11/2017    K 3.9 04/11/2017     04/11/2017    CO2 23 04/11/2017    BUN 52 (H) 04/11/2017    CREATININE 1.60 (H) 04/11/2017    CALCIUM 7.8 (L) 04/11/2017           Pertinent Radiology   Radiology Results: Personally reviewed image/s  EKG Results: not done              DR. ADELAIDE ISBELL  [facility]

## 2021-06-10 NOTE — PROGRESS NOTES
Patient Name: Almas Gaona   MRN: [id number]   Date of Admission: 3/28/2017    Procedure: CORONARY ARTERY BYPASS GRAFT X 3  ENDOSCOPIC VEIN HARVEST INTERNAL MAMMARY ARTERY ANESTHESIA TRANSESOPHAGEAL ECHOCARDIOGRAM    Post Op day #: 7    Subjective (Patient focus/Primary Problem for shift): Pain          Pain Goal 0 Pain Rating 4          Pain Medication/ Regime effective to reduce patient pain Tylenol/oxycodone, effective    Objective (Physical assessment):           Rhythm: normal sinus rhythm            Bowel Activity: no  if Yes indicate when:           Bowel Medications: yes            Incision: healing well          Incentive Spirometry Q 1-2 hour when awake:  yes Volume: 1000          Epicardial Pacing Wires:  no            Patient Activity:           Up to chair for meals: yes          Ambulation with RN x2 (Not including CR): yes            Is patient in home clothes:no             Chest Tubes   Pleural: no Draining: not applicable               Suction: no              Mediastinal: no Draining: not applicable               Suction: no   Dressing Change Daily:no If No, why?no dressing                     Urinary Catheter: no           Preventative WOC consult (need MD order): no       Assessment (Nursing primary shift focus): Inability to void, bladder scan for 130ml at 0630.  Exp wheezes clear with IS.  Deconditioned - assist of 1 with walker.  Pt has new pain/weakenss in RLE since 4/11/17.    Plan (Patient Care Plan/focus): Pain management, Increase activity, encourage IS      Keri Kaye   4/12/2017   7:09 AM

## 2021-06-10 NOTE — PROGRESS NOTES
Subjective:  Mr. Gaona  comes to clinic today for a 3  week post op visit s/p CABG on 4/5/2017 with Dr Marquis. His only post op issue was AFib which converted with amiodarone. He discharged to TCU on 4/14/2017 and plans to discharge home with family this Friday.     Overall he has been doing well. He denies SOB or chest pain. He is sleeping at night his appetite is improving. He iwill start cardiac rehab once he's been discharged from the TCU. He has not been readmitted to the hospital since discharge.    Current Medications:  Current Outpatient Prescriptions on File Prior to Visit   Medication Sig Dispense Refill     allopurinol (ZYLOPRIM) 100 MG tablet Take 1 tablet (100 mg total) by mouth daily. 30 tablet 0     amiodarone (PACERONE) 200 MG tablet Take 1 tablet (200 mg total) by mouth 2 (two) times a day. 50 tablet 0     aspirin 81 mg chewable tablet Chew 1 tablet (81 mg total) daily. 30 tablet 0     atenolol (TENORMIN) 25 MG tablet Take 1 tablet (25 mg total) by mouth daily. 30 tablet 0     atorvastatin (LIPITOR) 20 MG tablet Take 1 tablet (20 mg total) by mouth every morning. 30 tablet 0     ferrous sulfate 325 (65 FE) MG tablet Take 1 tablet (325 mg total) by mouth 2 (two) times a day with meals. 60 tablet 0     latanoprost (XALATAN) 0.005 % ophthalmic solution Administer 1 drop to both eyes at bedtime. 2.5 mL 0     niacin 250 MG tablet Take 1 tablet (250 mg total) by mouth 2 (two) times a day with meals. 30 tablet 0     NOVOLOG 100 unit/mL injection Subcutaneous, 3 times daily with meals, BG < 70 mg/dL: Treat, and call MD, BG  mg/dL: None - 0 Units, -180 mg/dL: 2 units, -220 mg/dL: 4 units, -260 mg/dL: 6 units, -300 mg/dL: 8 units, -340 mg/dL: 10 units, -400 mg/dL: 12 units, BG > 400 mg/dL: 14 units and call MD 10 mL 0     omeprazole (PRILOSEC) 20 MG capsule Take 1 capsule (20 mg total) by mouth Daily before breakfast for 14 days. 14 capsule 0     tamsulosin  "(FLOMAX) 0.4 mg Cp24 Take 1 capsule (0.4 mg total) by mouth daily after supper. 30 capsule 0     traZODone (DESYREL) 50 MG tablet Take 1 tablet (50 mg total) by mouth at bedtime as needed for sleep. 30 tablet 0     [] acetaminophen (TYLENOL) 325 MG tablet Take 2 tablets (650 mg total) by mouth every 4 (four) hours as needed for fever (pain level 2-6). 30 tablet 0     [] ascorbic acid, vitamin C, (VITAMIN C) 500 MG tablet Take 1 tablet (500 mg total) by mouth 2 (two) times a day for 10 days. 20 tablet 0     [] docusate sodium (COLACE) 100 MG capsule Take 1 capsule (100 mg total) by mouth 2 (two) times a day for 10 days. 20 capsule 0     furosemide (LASIX) 20 MG tablet Take 1 tablet (20 mg total) by mouth 2 (two) times a day at 9am and 6pm. 60 tablet      No current facility-administered medications on file prior to visit.        Objective:  Physical Exam:  /54 (Patient Site: Left Arm, Patient Position: Sitting, Cuff Size: Adult Regular)  Pulse 69  Temp 97.6  F (36.4  C)  Ht 5' 8\" (1.727 m)  Wt (!) 224 lb 1.6 oz (101.7 kg)  BMI 34.07 kg/m2    Heart: RRR no murmurs, rubs or gallops    Lungs: clear bilaterally    Incisions: sternal incision is well healed    Extremities: well perfused, no edema, harvest site healing well    Assessment:  1.  NSTEMI-s/p CABG    2. Hypertension-controlled    3. Post op AFib-resolved    4. Post op right leg weakness-improving     Plan:    Ok to start driving on May 5    You can start to lift up to 15 lbs for 2 weeks then 25 lbs for 6 weeks then resume normal activities    Call me at [phone] with any questions    Mindy Willingham CNP  Pager [phone]          "

## 2021-06-10 NOTE — PROGRESS NOTES
"CV Surgery POD # 8 CAB      Subjective :  Says right leg is better can lift it up but says its still weak, has some numbness outer thigh, said he was told MRI was abnormal and may be contributing to his inability to void    Objective:  /68 (Patient Position: Lying)  Pulse 69  Temp 98  F (36.7  C) (Oral)   Resp 18  Ht 5' 8\" (1.727 m)  Wt (!) 236 lb 12.8 oz (107.4 kg)  SpO2 92%  BMI 36.01 kg/m2      Weight: pending, yesterday 107.4, pre 110  Oxygen: 92% RA      Physical Exam:  Neuro: A&O, no focal deficits  Heart: RRR no r/m  Lungs: clear few crackles left base  Abdomen: large and soft +BS and BM  Incisions: CDI  Extremities: warm, mild LE edema      Ambulation: ambulated 110 and 200 feet yesterday      UO:  UO 1900cc/24 hours      Labs:  K 4.3, Mg 2.5, phos 4.7   -167      Imaging:  CXR 4/11: Removal of prior drains and tubes. Improved but persistent small pleural effusions. No pneumothorax. Stable cardiomediastinal silhouette enlargement. Sternotomy. Calcified pleural plaques. Mild basilar atelectasis. Apparent small air-fluid level over the right of midline thorax projecting near the third from the top sternotomy wire. This should be followed to exclude small collection.    MRI [facility] 4/12:   CONCLUSION:  1. The vertebral body heights are maintained. There are slight retrolistheses of L2 in relation to L3, L3 in relation to L4, and L5 in relation to the sacrum.     2. At the L1-2 disc space level there is mild bilateral lateral recess narrowing.     3. At the L2-3 disc space level there is bilateral lateral recess narrowing.     4. At the L3-4 disc space level there is moderate canal compromise with bilateral lateral recess narrowing and mild left neural foraminal narrowing.     5. At the L4-5 disc space level there is moderate to severe canal compromise with bilateral lateral recess narrowing with moderate right neural foraminal narrowing and mild left neural foraminal narrowing.     6. " At the L5-S1 disc space level there is a right paracentral disc extrusion extending inferiorly. This does lead to displacement the exiting right S1 nerve root sleeve. There is mild bilateral lateral recess narrowing and mild bilateral neural   foraminal narrowing.      Assessment:  1. Acute NSTEMI-s/p urgent CAB  2. Hypertension-stable on atenolol 25 mg qd  3. Hyperlipidemia-on lipitor  4. Diabetes II controlled A1C 6.0%-SSI coverage  5. Thrombocytopenia-resolved  6. Acute post op blood loss anemia-stable  7. Hypervolemia- on daily lasix  8. Urinary retention-balderrama replaced 4/12, flomax started 4/6  9. New right leg weakness-neurosurgery consulted 4/12     Plan:  To TCU at discharge      Mindy Willingham CNP  Pager [phone]

## 2021-06-15 PROBLEM — Z95.1 S/P CABG X 3: Status: ACTIVE | Noted: 2017-04-05

## 2021-06-15 PROBLEM — E78.5 DYSLIPIDEMIA: Status: ACTIVE | Noted: 2017-03-28

## 2021-06-15 PROBLEM — I10 HYPERTENSION: Status: ACTIVE | Noted: 2017-03-28

## 2021-06-15 PROBLEM — I21.4 NSTEMI (NON-ST ELEVATED MYOCARDIAL INFARCTION) (H): Status: ACTIVE | Noted: 2017-03-28

## 2021-06-15 PROBLEM — I25.10 CAD (CORONARY ARTERY DISEASE): Status: ACTIVE | Noted: 2017-03-28

## 2021-06-15 PROBLEM — E11.9 TYPE 2 DIABETES MELLITUS (H): Status: ACTIVE | Noted: 2017-03-28

## 2021-06-25 NOTE — PROGRESS NOTES
Progress Notes by Batsheva Beck MD at 4/3/2017 11:04 AM     Author: Batsheva Beck MD Service: Cardiology Author Type: Physician    Filed: 4/3/2017 11:09 AM Date of Service: 4/3/2017 11:04 AM Status: Signed    : Batsheva Beck MD (Physician)           CARDIOLOGY PROGRESS NOTE      Assessment/Plan:  1.  NSTEMI (non-ST elevated myocardial infarction)-due to underlying coronary artery disease.  Suspect this is due to right coronary artery distribution.  2.  CAD (coronary artery disease)-angiography  showed normal left main, proximal to mid left anterior descending 80% lesion, mid circumflex 80% lesion, proximal right coronary artery 90% lesion with a mid total occlusion.  Distal right coronary artery to 50% lesion.  Plan is for coronary artery bypass grafting in 2 days.  3.  Hypertension-pressure under good control currently.  4.  Dyslipidemia-cholesterol 160 and patient on atorvastatin.  5.  Type 2 diabetes mellitus-defer to primary care team.  6.  Thrombocytopenia-mildly an idiopathic possibly myelodysplasia or mild immune mediated thrombocytopenia.  No contraindication to coronary artery bypass grafting.    Plan:  1.  Continue current medications.  2.  Anticipate coronary artery bypass grafting this Wednesday.    Discharge [facility].  Anticipate discharge home following coronary artery bypass grafting.  2.  Can follow-up with primary cardiologist Dr. Ulisses Jones.     LOS: 6 days     Subjective:  Interval History:    76-year-old white gentleman being seen on seventh day of hospitalization.  He feels well.Patient complains of no syncope, dizziness, fatigue, fevers, chest pain, palpitations, shortness of breath, PND, orthopnea, nausea, vomiting, or edema.    Medications  ? amLODIPine  5 mg Oral DAILY   ? atenolol  25 mg Oral DAILY   ? atorvastatin  80 mg Oral DAILY   ? latanoprost  1 drop Both Eyes QHS   ? losartan-hydrochlorothiazide (HYZAAR) 100-12.5 mg combo dose (INP)   Oral DAILY   ?  "mupirocin  1 application Nasal BID   ? sodium chloride  3 mL Intravenous Line Care   ? tamsulosin  0.4 mg Oral Daily after supper       Objective:   Vital signs in last 24 hours:  Temp:  [97.4  F (36.3  C)-98.2  F (36.8  C)] 97.7  F (36.5  C)  Heart Rate:  [48-61] 61  Resp:  [16-20] 16  BP: (117-141)/(61-78) 117/78  Weight:   (!) 231 lb 14.4 oz (105.2 kg)        Physical Exam:  /78 (Patient Position: Sitting)  Pulse 61  Temp 97.7  F (36.5  C) (Oral)   Resp 16  Ht 5' 8\" (1.727 m)  Wt (!) 231 lb 14.4 oz (105.2 kg)  SpO2 96%  BMI 35.26 kg/m2    General Appearance:    Alert, cooperative, no distress, appears stated age   Head:    Normocephalic, without obvious abnormality, atraumatic   Throat:   Lips, mucosa, and tongue normal; teeth and gums normal   Neck:   Supple, symmetrical, trachea midline, no adenopathy;        thyroid:  No enlargement/tenderness/nodules; no carotid    bruit or JVD   Back:     Symmetric, no curvature, ROM normal, no CVA tenderness   Lungs:     Clear to auscultation bilaterally, respirations unlabored   Chest wall:    No tenderness or deformity   Heart:    Regular rate and rhythm, S1 and S2 normal, no murmur, rub   or gallop   Abdomen:     Soft, non-tender, bowel sounds active all four quadrants,     no masses, no organomegaly   Extremities:   Normal, atraumatic, no cyanosis or edema   Pulses:   2+ and symmetric all extremities   Skin:   Skin color, texture, turgor normal, no rashes or lesions     Cardiographics:      ECG: Personally reviewed by myself shows sinus bradycardia, occasional PVC, no acute changes.      Echocardiogram: Left ventricular systolic function appears lower limits of normal estimated visual ejection fraction of approximately 55%.    The following segments are akinetic: basal inferior and mid inferoseptal. The following segments are hypokinetic: basal inferolateral and mid inferior    Left Atrium: Left atrial volume is mildly increased    No observed significant " valve abnormality    Pericardium: Trace pericardial effusion        Lab Results:     Results from last 7 days  Lab Units 04/03/17  0547   LN-WHITE BLOOD CELL COUNT thou/uL 7.4   LN-HEMOGLOBIN g/dL 11.9*   LN-HEMATOCRIT % 37.6*   LN-PLATELET COUNT thou/uL 106*       Results from last 7 days  Lab Units 04/03/17  0547  03/31/17  0828   LN-SODIUM mmol/L  --   --  139   LN-POTASSIUM mmol/L 4.2  < > 4.4   LN-CHLORIDE mmol/L  --   --  105   LN-CO2 mmol/L  --   --  23   LN-BLOOD UREA NITROGEN mg/dL  --   --  21   LN-CREATININE mg/dL  --   --  1.29   LN-CALCIUM mg/dL  --   --  9.5   < > = values in this interval not displayed..       Lab Results   Component Value Date    TROPONINI 6.19 (HH) 03/28/2017

## 2021-07-01 NOTE — CONSULTS
Consults by Rudi Jones MD (Ted) at 3/28/2017 12:10 PM     Author: Rudi Jones MD (Ted) Service: Cardiology Author Type: Physician    Filed: 3/28/2017 12:19 PM Date of Service: 3/28/2017 12:10 PM Status: Signed    : Rudi Jones MD (Ted) (Physician)     Consult Orders    1. Inpatient consult to Cardiology Reason for Consult? nstemi; Did you contact the consulting MD? No; Consult priority: Today (routine); Communication for MD: No phone communication necessary for now [[id number]] ordered by Renu Santoyo MBBS at 03/28/17 0916                 Click to link to [facility] HEART CARE NOTE    Thank you, Dr. Chance ref. provider found, for asking the [facility] Care team to see Almas Gaona to evaluate chest pain.      Assessment/Recommendations   Assessment:    Non-ST segment elevation myocardial infarction, hemodynamically stable, pain-free at present time  Coronary artery disease with remote history of circumflex stenting  Hypertension  Diabetes mellitus    Plan:  Aspirin and IV heparin  Continue home medications  Coronary angiogram and possible intervention.  Risks, benefits, goals, alternatives were discussed with the patient.  He agrees to proceed       History of Present Illness/Subjective    Mr. Almas Gaona is a 76 y.o. male follow-up left-sided chest pain last night.  The pain started at  1:30 in the morning.  The pain awoken him from sleep.  There was radiation of pain down his left arm.  He had associated shortness of breath.  Paramedics were summoned.  The pain was eventually relieved by sublingual nitroglycerin.  He denies any chest discomfort at present time.  He was brought to the emergency room at [facility].  Troponin was mildly elevated.  EKG showed ST segment depression which eventually improved with treatment.  We do not have copies of those EKGs.  He has a history of coronary artery disease.  He had  balloon angioplasty followed by stenting of presumed the circumflex in early 90s.  He has not had any recent cardiac evaluation.  S he has chronic shortness of breath.  He denies PND and orthopnea.  He has not had heart palpitations or syncope.    ECG: Personally reviewed.  Pending    ECHO (personnaly Reviewed): Pending         Physical Examination Review of Systems   Vitals:    03/28/17 0907   BP: 165/78   Pulse: 70   Resp: 16   Temp: 98  F (36.7  C)   SpO2: 97%     Body mass index is 37.17 kg/(m^2).  Wt Readings from Last 3 Encounters:   03/28/17 (!) 244 lb 7 oz (110.9 kg)     No intake or output data in the 24 hours ending 03/28/17 1210  General Appearance:   Alert, cooperative, no distress, appears stated age   Head/ENT: Normocephalic, without obvious abnormality. Membranes moist.      EYES:  No scleral icterus   Neck: Supple, symmetrical, trachea midline, no adenopathy, thyroid: not enlarged, symmetric, no carotid bruit or JVD   Chest/Lungs:   lungs are clear to auscultation, respirations unlabored. No tenderness or deformity   Cardiovascular:   Regular rhythm, S1, S2 normal, no murmur, rub or gallop.   Abdomen:  Soft, non-tender, bowel sounds active all four quadrants,  no masses, no organomegaly   Extremities: no cyanosis or clubbing. No edema   Skin: Skin color, texture, turgor normal, no rashes or lesions.    Neurologic: Alert and oriented x 3, moving all four extremities.    Psychiatric: Normal affect.      10 system review of systems completed see history of present illness and inpatient H&P (reviewed) for further details.         Medical History  Surgical History Family History Social History   Coronary artery disease   Diabetes mellitus   Family History   Problem Relation Age of Onset   ? Diabetes Brother     Social History     Social History   ? Marital status:      Spouse name: N/A   ? Number of children: N/A   ? Years of education: N/A     Occupational History   ?  retired        Social History Main Topics   ? Smoking status: Former Smoker     Packs/day: 1.00     Years: 30.00     Types: Cigarettes     Start date: 3/28/1961     Quit date: 3/28/1991   ? Smokeless tobacco: Not on file   ? Alcohol use No      Comment: occasional drinks   ? Drug use: No   ? Sexual activity: Not on file     Other Topics Concern   ? Not on file     Social History Narrative   ? No narrative on file          Medications  Allergies   Scheduled Meds:  ? amLODIPine  5 mg Oral DAILY   ? aspirin  81 mg Oral DAILY   ? atenolol  25 mg Oral DAILY   ? atorvastatin  20 mg Oral DAILY   ? latanoprost  1 drop Both Eyes QHS   ? losartan-hydrochlorothiazide (HYZAAR) 100-12.5 mg combo dose (INP)   Oral DAILY   ? niacin  250 mg Oral BID with meals   ? tamsulosin  0.4 mg Oral Daily after supper     Continuous Infusions:  ? heparin infusion 12 Units/kg/hr (03/28/17 1021)   ? nacl 0.9% 50 mL/hr (03/28/17 1036)     PRN Meds:.dextrose 50 % (D50W), fentaNYL pf, glucagon (human recombinant), heparin, heparin, midazolam, naloxone **OR** naloxone, nitroglycerin No Known Allergies      Lab Results    Chemistry/lipid CBC Cardiac Enzymes/BNP/TSH/INR   Lab Results   Component Value Date    CHOL 160 03/28/2017    HDL 41 03/28/2017    LDLCALC  03/28/2017      Comment:      Invalid, Triglycerides >400    TRIG 419 (H) 03/28/2017    Lab Results   Component Value Date    WBC 8.3 03/28/2017    HGB 12.1 (L) 03/28/2017    HCT 38.0 (L) 03/28/2017    MCV 94 03/28/2017    PLT 85 (L) 03/28/2017    Lab Results   Component Value Date    TROPONINI 2.11 (HH) 03/28/2017    INR 0.94 03/28/2017     Lab Results   Component Value Date    TROPONINI 2.11 (HH) 03/28/2017

## 2022-02-09 NOTE — ANESTHESIA CARE TRANSFER NOTE
Last vitals:   Vitals:    04/05/17 1159   BP:    Pulse:    Resp:    Temp: (!) 35.6  C (96.1  F)   SpO2:      Patient transferred to bed.  Transport monitor on.  O2 at 15L via ambu bag.  Accompanied to ICU with anesthesiologist.  In ICU vital signs stable.  Vent settings: tidal volume 525, rate 16, FiO2 60%, PEEP 5.  SBAR report to RN per institutional policy and procedure.  Transfer of care.    Patient's level of consciousness is sedated and intubated  Spontaneous respirations: no: intubated  Maintains airway independently: no: intubated  Dentition unchanged: yes  Oropharynx: endotracheal tube in place    QCDR Measures:  ASA# 20 - Surgical Safety Checklist: ASA20A - Safety Checks Done  PQRS# 430 - Adult PONV Prevention: 4558F-8P - Pt did NOT receive => 2 anti-emetic agents  ASA# 8 - Peds PONV Prevention: NA - Not pediatric patient, not GA or 2 or more risk factors NOT present  PQRS# 424 - Krystina-op Temp Management: 4559F - At least one body temp DOCUMENTED => 35.5C or 95.9F within required timeframe  PQRS# 426 - PACU Transfer Protocol: - Transfer of care checklist used  ASA# 14 - Acute Post-op Pain: ASA14B - Patient did NOT experience pain >= 7 out of 10    I completed my SBAR handoff to the receiving nurse per policy and procedure.  
[facility]